# Patient Record
Sex: MALE | Race: BLACK OR AFRICAN AMERICAN | NOT HISPANIC OR LATINO | Employment: FULL TIME | URBAN - METROPOLITAN AREA
[De-identification: names, ages, dates, MRNs, and addresses within clinical notes are randomized per-mention and may not be internally consistent; named-entity substitution may affect disease eponyms.]

---

## 2023-06-10 DIAGNOSIS — I10 ESSENTIAL (PRIMARY) HYPERTENSION: ICD-10-CM

## 2023-06-16 RX ORDER — CHLORTHALIDONE 25 MG/1
TABLET ORAL
Qty: 45 TABLET | Refills: 2 | Status: SHIPPED | OUTPATIENT
Start: 2023-06-16 | End: 2024-03-07

## 2023-07-12 ENCOUNTER — HOSPITAL ENCOUNTER (OUTPATIENT)
Dept: DATA CONVERSION | Facility: HOSPITAL | Age: 43
End: 2023-07-12
Attending: PAIN MEDICINE | Admitting: PAIN MEDICINE
Payer: COMMERCIAL

## 2023-07-12 DIAGNOSIS — M54.16 RADICULOPATHY, LUMBAR REGION: ICD-10-CM

## 2023-07-12 DIAGNOSIS — M54.10 RADICULOPATHY, SITE UNSPECIFIED: ICD-10-CM

## 2023-09-29 VITALS — WEIGHT: 272.93 LBS | HEIGHT: 71 IN | BODY MASS INDEX: 38.21 KG/M2

## 2023-10-04 PROBLEM — K21.9 ESOPHAGEAL REFLUX: Status: ACTIVE | Noted: 2023-10-04

## 2023-10-04 PROBLEM — R19.7 DIARRHEA: Status: ACTIVE | Noted: 2023-10-04

## 2023-10-04 PROBLEM — G47.33 OSA (OBSTRUCTIVE SLEEP APNEA): Status: ACTIVE | Noted: 2023-10-04

## 2023-10-04 PROBLEM — M48.061 LUMBAR STENOSIS: Status: ACTIVE | Noted: 2023-10-04

## 2023-10-04 PROBLEM — I10 BENIGN ESSENTIAL HYPERTENSION: Status: ACTIVE | Noted: 2023-10-04

## 2023-10-04 PROBLEM — N52.9 ORGANIC IMPOTENCE: Status: ACTIVE | Noted: 2023-10-04

## 2023-10-04 RX ORDER — GABAPENTIN 600 MG/1
600 TABLET ORAL 3 TIMES DAILY
COMMUNITY
Start: 2023-06-02 | End: 2023-11-20

## 2023-10-04 RX ORDER — OMEPRAZOLE 20 MG/1
20 CAPSULE, DELAYED RELEASE ORAL
COMMUNITY
Start: 2021-10-21 | End: 2023-11-16 | Stop reason: SDUPTHER

## 2023-10-04 RX ORDER — AMLODIPINE AND BENAZEPRIL HYDROCHLORIDE 10; 40 MG/1; MG/1
1 CAPSULE ORAL DAILY
COMMUNITY
Start: 2020-07-14 | End: 2023-11-02

## 2023-10-04 RX ORDER — TADALAFIL 20 MG/1
20 TABLET ORAL DAILY PRN
COMMUNITY
End: 2023-11-06

## 2023-10-04 NOTE — PROGRESS NOTES
Subjective   Patient ID: Christopher Lam is a 42 y.o. male who presents with hematochezia    HPI   The patient reports a history of hematochezia with almost every bowel movement over the past week.  He reports a long history of passage of multiple soft solid and/or liquid stools per day times many years-unchanged over the past week.  He reports no fever, chills, abdominal pain, nausea, vomiting, melena,,, pain with defecation, anorexia, presyncope.  The patient does report that he did have a colonoscopy 10-14 years ago which was unremarkable  Review of Systems    Objective   There were no vitals taken for this visit.    Physical Exam  Abdomen-soft, obese.  Slightly hypoactive bowel sounds.  There is a 10 x 5 cm mass in the epigastrium and a 4 x 1 cm mass in the umbilical region.  Palpation of both regions revealed no tenderness, increase in warmth, no tenderness or masses noted throughout the rest of the abdomen  Rectal-no stool in vault.  Hemorrhoids noted.  Prostate gland not enlarged, no masses  Assessment/Plan        Assessment  Hematochezia-May be secondary to hemorrhoidal bleeding in the setting of the patient's chronic diarrhea, ischemic colitis-unlikely  Chronic diarrhea-unsure of etiology.  May be secondary to IBS-D.  Plan  Obtain CBC differential, CMP, TSH, coagulation panel, CRP, stool studies, celiac panel ASAP.  If above studies unremarkable, I will consider obtaining a hydrogen breath test and I will start the patient on a probiotic daily.

## 2023-10-05 ENCOUNTER — LAB (OUTPATIENT)
Dept: LAB | Facility: LAB | Age: 43
End: 2023-10-05
Payer: COMMERCIAL

## 2023-10-05 ENCOUNTER — OFFICE VISIT (OUTPATIENT)
Dept: PRIMARY CARE | Facility: CLINIC | Age: 43
End: 2023-10-05
Payer: COMMERCIAL

## 2023-10-05 VITALS
DIASTOLIC BLOOD PRESSURE: 94 MMHG | BODY MASS INDEX: 38.4 KG/M2 | HEART RATE: 90 BPM | WEIGHT: 274.6 LBS | SYSTOLIC BLOOD PRESSURE: 120 MMHG

## 2023-10-05 DIAGNOSIS — R68.82 DECREASED LIBIDO: ICD-10-CM

## 2023-10-05 DIAGNOSIS — K92.1 HEMATOCHEZIA: ICD-10-CM

## 2023-10-05 DIAGNOSIS — K92.1 HEMATOCHEZIA: Primary | ICD-10-CM

## 2023-10-05 DIAGNOSIS — R19.7 DIARRHEA, UNSPECIFIED TYPE: ICD-10-CM

## 2023-10-05 LAB
ALBUMIN SERPL BCP-MCNC: 4.4 G/DL (ref 3.4–5)
ALP SERPL-CCNC: 76 U/L (ref 33–120)
ALT SERPL W P-5'-P-CCNC: 41 U/L (ref 10–52)
ANION GAP SERPL CALC-SCNC: 11 MMOL/L (ref 10–20)
AST SERPL W P-5'-P-CCNC: 25 U/L (ref 9–39)
BASOPHILS # BLD AUTO: 0.03 X10*3/UL (ref 0–0.1)
BASOPHILS NFR BLD AUTO: 0.5 %
BILIRUB SERPL-MCNC: 0.4 MG/DL (ref 0–1.2)
BUN SERPL-MCNC: 15 MG/DL (ref 6–23)
CALCIUM SERPL-MCNC: 9.9 MG/DL (ref 8.6–10.6)
CHLORIDE SERPL-SCNC: 101 MMOL/L (ref 98–107)
CO2 SERPL-SCNC: 34 MMOL/L (ref 21–32)
CREAT SERPL-MCNC: 1.29 MG/DL (ref 0.5–1.3)
CRP SERPL-MCNC: 0.89 MG/DL
EOSINOPHIL # BLD AUTO: 0.04 X10*3/UL (ref 0–0.7)
EOSINOPHIL NFR BLD AUTO: 0.7 %
ERYTHROCYTE [DISTWIDTH] IN BLOOD BY AUTOMATED COUNT: 14.2 % (ref 11.5–14.5)
GFR SERPL CREATININE-BSD FRML MDRD: 70 ML/MIN/1.73M*2
GLUCOSE SERPL-MCNC: 102 MG/DL (ref 74–99)
HCT VFR BLD AUTO: 44.9 % (ref 41–52)
HGB BLD-MCNC: 14.1 G/DL (ref 13.5–17.5)
IMM GRANULOCYTES # BLD AUTO: 0.01 X10*3/UL (ref 0–0.7)
IMM GRANULOCYTES NFR BLD AUTO: 0.2 % (ref 0–0.9)
LYMPHOCYTES # BLD AUTO: 2.07 X10*3/UL (ref 1.2–4.8)
LYMPHOCYTES NFR BLD AUTO: 34.7 %
MCH RBC QN AUTO: 28.7 PG (ref 26–34)
MCHC RBC AUTO-ENTMCNC: 31.4 G/DL (ref 32–36)
MCV RBC AUTO: 91 FL (ref 80–100)
MONOCYTES # BLD AUTO: 0.63 X10*3/UL (ref 0.1–1)
MONOCYTES NFR BLD AUTO: 10.6 %
NEUTROPHILS # BLD AUTO: 3.19 X10*3/UL (ref 1.2–7.7)
NEUTROPHILS NFR BLD AUTO: 53.3 %
NRBC BLD-RTO: 0 /100 WBCS (ref 0–0)
PLATELET # BLD AUTO: 425 X10*3/UL (ref 150–450)
PMV BLD AUTO: 9.7 FL (ref 7.5–11.5)
POTASSIUM SERPL-SCNC: 4.3 MMOL/L (ref 3.5–5.3)
PROT SERPL-MCNC: 7.2 G/DL (ref 6.4–8.2)
RBC # BLD AUTO: 4.92 X10*6/UL (ref 4.5–5.9)
SODIUM SERPL-SCNC: 142 MMOL/L (ref 136–145)
TSH SERPL-ACNC: 2.67 MIU/L (ref 0.44–3.98)
WBC # BLD AUTO: 6 X10*3/UL (ref 4.4–11.3)

## 2023-10-05 PROCEDURE — 86258 DGP ANTIBODY EACH IG CLASS: CPT

## 2023-10-05 PROCEDURE — 3080F DIAST BP >= 90 MM HG: CPT | Performed by: INTERNAL MEDICINE

## 2023-10-05 PROCEDURE — 80050 GENERAL HEALTH PANEL: CPT

## 2023-10-05 PROCEDURE — 86364 TISS TRNSGLTMNASE EA IG CLAS: CPT

## 2023-10-05 PROCEDURE — 99213 OFFICE O/P EST LOW 20 MIN: CPT | Performed by: INTERNAL MEDICINE

## 2023-10-05 PROCEDURE — 3074F SYST BP LT 130 MM HG: CPT | Performed by: INTERNAL MEDICINE

## 2023-10-05 PROCEDURE — 84402 ASSAY OF FREE TESTOSTERONE: CPT

## 2023-10-05 PROCEDURE — 85730 THROMBOPLASTIN TIME PARTIAL: CPT

## 2023-10-05 PROCEDURE — 36415 COLL VENOUS BLD VENIPUNCTURE: CPT

## 2023-10-05 PROCEDURE — 85610 PROTHROMBIN TIME: CPT

## 2023-10-05 PROCEDURE — 86140 C-REACTIVE PROTEIN: CPT

## 2023-10-05 RX ORDER — MULTIVITAMIN
1 TABLET ORAL DAILY
COMMUNITY

## 2023-10-06 LAB
APTT PPP: 33 SECONDS (ref 27–38)
GLIADIN PEPTIDE IGA SER IA-ACNC: 1.9 U/ML
GLIADIN PEPTIDE IGG SER IA-ACNC: 39.9 U/ML
INR PPP: 1 (ref 0.9–1.1)
PROTHROMBIN TIME: 11.8 SECONDS (ref 9.8–12.8)
TTG IGA SER IA-ACNC: <1 U/ML
TTG IGG SER IA-ACNC: <1 U/ML

## 2023-10-09 ENCOUNTER — LAB (OUTPATIENT)
Dept: LAB | Facility: LAB | Age: 43
End: 2023-10-09
Payer: COMMERCIAL

## 2023-10-09 DIAGNOSIS — K92.1 MELENA: ICD-10-CM

## 2023-10-09 DIAGNOSIS — R19.7 DIARRHEA, UNSPECIFIED: Primary | ICD-10-CM

## 2023-10-09 DIAGNOSIS — K92.1 HEMATOCHEZIA: ICD-10-CM

## 2023-10-09 DIAGNOSIS — R19.7 DIARRHEA, UNSPECIFIED: ICD-10-CM

## 2023-10-09 LAB
TESTOSTERONE FREE (CHAN): 76 PG/ML (ref 35–155)
TESTOSTERONE,TOTAL,LC-MS/MS: 335 NG/DL (ref 250–1100)

## 2023-10-09 PROCEDURE — 83993 ASSAY FOR CALPROTECTIN FECAL: CPT

## 2023-10-09 PROCEDURE — 87329 GIARDIA AG IA: CPT

## 2023-10-09 PROCEDURE — 87506 IADNA-DNA/RNA PROBE TQ 6-11: CPT

## 2023-10-10 LAB

## 2023-10-12 LAB
CALPROTECTIN STL-MCNT: 228 UG/G
G LAMBLIA AG STL QL IA: NEGATIVE

## 2023-11-01 DIAGNOSIS — I10 BENIGN ESSENTIAL HYPERTENSION: ICD-10-CM

## 2023-11-02 RX ORDER — AMLODIPINE AND BENAZEPRIL HYDROCHLORIDE 10; 40 MG/1; MG/1
1 CAPSULE ORAL DAILY
Qty: 90 CAPSULE | Refills: 3 | Status: SHIPPED | OUTPATIENT
Start: 2023-11-02

## 2023-11-04 PROBLEM — M77.12 LATERAL EPICONDYLITIS OF LEFT ELBOW: Status: ACTIVE | Noted: 2023-11-04

## 2023-11-04 PROBLEM — R19.7 DIARRHEA: Status: RESOLVED | Noted: 2023-10-04 | Resolved: 2023-11-04

## 2023-11-04 PROBLEM — D49.2 LUMBAR SPINE TUMOR: Status: ACTIVE | Noted: 2023-11-04

## 2023-11-04 RX ORDER — NABUMETONE 500 MG/1
2 TABLET, FILM COATED ORAL 2 TIMES DAILY
COMMUNITY
Start: 2022-09-07 | End: 2023-11-06

## 2023-11-04 RX ORDER — GABAPENTIN 300 MG/1
300 CAPSULE ORAL
COMMUNITY
Start: 2023-05-18 | End: 2023-11-06

## 2023-11-04 RX ORDER — TADALAFIL 10 MG/1
10 TABLET ORAL DAILY PRN
COMMUNITY
Start: 2020-09-15 | End: 2023-11-06

## 2023-11-06 ENCOUNTER — OFFICE VISIT (OUTPATIENT)
Dept: GASTROENTEROLOGY | Facility: CLINIC | Age: 43
End: 2023-11-06
Payer: COMMERCIAL

## 2023-11-06 VITALS
WEIGHT: 276.35 LBS | HEART RATE: 82 BPM | RESPIRATION RATE: 18 BRPM | SYSTOLIC BLOOD PRESSURE: 124 MMHG | OXYGEN SATURATION: 97 % | BODY MASS INDEX: 38.64 KG/M2 | DIASTOLIC BLOOD PRESSURE: 80 MMHG | TEMPERATURE: 97.7 F

## 2023-11-06 DIAGNOSIS — K52.9 CHRONIC DIARRHEA: ICD-10-CM

## 2023-11-06 DIAGNOSIS — R14.0 ABDOMINAL BLOATING: ICD-10-CM

## 2023-11-06 DIAGNOSIS — R76.8 POSITIVE AUTOANTIBODY SCREENING FOR CELIAC DISEASE: Primary | ICD-10-CM

## 2023-11-06 DIAGNOSIS — K92.1 HEMATOCHEZIA: ICD-10-CM

## 2023-11-06 PROCEDURE — 99204 OFFICE O/P NEW MOD 45 MIN: CPT | Performed by: NURSE PRACTITIONER

## 2023-11-06 PROCEDURE — 3074F SYST BP LT 130 MM HG: CPT | Performed by: NURSE PRACTITIONER

## 2023-11-06 PROCEDURE — 3079F DIAST BP 80-89 MM HG: CPT | Performed by: NURSE PRACTITIONER

## 2023-11-06 RX ORDER — POLYETHYLENE GLYCOL 3350 17 G/17G
238 POWDER, FOR SOLUTION ORAL ONCE
Qty: 238 G | Refills: 0 | Status: SHIPPED | OUTPATIENT
Start: 2023-11-06 | End: 2023-11-06

## 2023-11-06 ASSESSMENT — ENCOUNTER SYMPTOMS
FEVER: 0
PALPITATIONS: 0
MYALGIAS: 0
BACK PAIN: 0
HEMATURIA: 0
WEAKNESS: 0
ARTHRALGIAS: 0
FLANK PAIN: 0
ADENOPATHY: 0
DIZZINESS: 0
HALLUCINATIONS: 0
SORE THROAT: 0
FATIGUE: 0
DYSURIA: 0
LIGHT-HEADEDNESS: 0
AGITATION: 0
NERVOUS/ANXIOUS: 0
PHOTOPHOBIA: 0
NUMBNESS: 0
JOINT SWELLING: 0
DIAPHORESIS: 0
SHORTNESS OF BREATH: 0
WHEEZING: 0
CHILLS: 0
EYE PAIN: 0
COUGH: 0

## 2023-11-06 NOTE — PROGRESS NOTES
"Subjective   Patient ID: Christopher Lam is a 42 y.o. male who presents for evaluation of rectal bleeding and chronic diarrhea     This is a 42 year old AAM with history of obesity, FLORINA on CPAP, HTN, and suspected Schwannoma who is presenting to the GI clinic for an initial visit.     History per pt and review of EMR including OSH records     Reports in 9/2023 he developed a week's worth of bright to dark red rectal bleeding. He saw blood on the toilet paper and mixed into his stools at that time (this was confirmed with photos on his phone he showed me). States he saw his PCP where  he was noted to have a hemorrhoid.     Reports for at least 10 years he's been having diarrhea. States \"90 %\" of his stools are loose. He has diarrhea with urgency 5-6 times per day (frequently after eating). . He has diarrhea which wakes him from sleep at night on occasion. He occasionally has formed stools.     Recent work up per PCP noting fecal calprotectin elevated to  228. Celiac serologies noting a normal tTG IgA, but DGP IgG was elevated to 39.9. Stool pathogen panel was negative. Tested negative for giardia.     ROS positive for several years' worth of  occasional  postprandial abdominal bloating.     Denies unintentional weight loss, abdominal pain, abdominal discomfort, vomiting, constipation, hematemesis, or melena.     Reports some weight gain recently. 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 Describes his job as sedentary.     Diet includes gluten.         Past medical history:   See above    Past surgical history:  None    Family history:   Paternal great aunt- pancreatic cancer   No IBD or celiac disease   Father- bone cancer (now in remission)     Social history:   Rarely drinks alcohol; no binge drinking  Denies use of tobacco or illicit drugs       Colonoscopy 12/5/14 with random biopsies was unremarkable (Dr. Shaniqua Sexton); tested negative for Cdiff at that time       Review of Systems "   Constitutional:  Negative for chills, diaphoresis, fatigue and fever.   HENT:  Negative for congestion, ear pain, hearing loss, sneezing and sore throat.    Eyes:  Negative for photophobia, pain and visual disturbance.   Respiratory:  Negative for cough, shortness of breath and wheezing.    Cardiovascular:  Negative for chest pain, palpitations and leg swelling.   Endocrine: Negative for cold intolerance and heat intolerance.   Genitourinary:  Negative for dysuria, flank pain, hematuria and urgency.   Musculoskeletal:  Negative for arthralgias, back pain, gait problem, joint swelling and myalgias.   Skin:  Negative for rash.   Neurological:  Negative for dizziness, syncope, weakness, light-headedness and numbness.   Hematological:  Negative for adenopathy.   Psychiatric/Behavioral:  Negative for agitation and hallucinations. The patient is not nervous/anxious.        /80 (BP Location: Right arm, Patient Position: Sitting)   Pulse 82   Temp 36.5 °C (97.7 °F)   Resp 18   Wt 125 kg (276 lb 5.6 oz)   SpO2 97%   BMI 38.64 kg/m²      Objective     Allergies   Allergen Reactions    Shellfish Derived Diarrhea and Nausea/vomiting     Current Outpatient Medications   Medication Sig Dispense Refill    amLODIPine-benazepriL (Lotrel) 10-40 mg capsule TAKE 1 CAPSULE BY MOUTH EVERY DAY 90 capsule 3    chlorthalidone (Hygroton) 25 mg tablet TAKE 1/2 TABLET BY MOUTH EVERY DAY 45 tablet 2    gabapentin (Neurontin) 600 mg tablet Take 1 tablet (600 mg) by mouth 3 times a day.      multivitamin tablet Take 1 tablet by mouth once daily.      NON FORMULARY Take 1 each by mouth once daily.  Emergen-C Five Oral Packet      omeprazole (PriLOSEC) 20 mg DR capsule Take 1 capsule (20 mg) by mouth once daily in the morning. Take before meals.      polyethylene glycol (Glycolax, Miralax) 17 gram/dose powder Take 238 g by mouth 1 time for 1 dose. Use as directed by  Endoscopy Department for colonoscopy 238 g 0     No current  facility-administered medications for this visit.        Physical Exam  Constitutional:       General: He is not in acute distress.     Appearance: Normal appearance. He is obese.   HENT:      Head: Normocephalic and atraumatic.      Mouth/Throat:      Mouth: Mucous membranes are moist.   Eyes:      Conjunctiva/sclera: Conjunctivae normal.   Cardiovascular:      Rate and Rhythm: Normal rate and regular rhythm.      Heart sounds: No murmur heard.     No gallop.   Pulmonary:      Effort: Pulmonary effort is normal.      Breath sounds: Normal breath sounds.   Abdominal:      General: Bowel sounds are normal. There is no distension.      Tenderness: There is no abdominal tenderness. There is no guarding.   Musculoskeletal:         General: No swelling or deformity. Normal range of motion.      Cervical back: Normal range of motion. No rigidity.   Skin:     General: Skin is warm and dry.      Coloration: Skin is not jaundiced.      Findings: No lesion or rash.   Neurological:      General: No focal deficit present.      Mental Status: He is alert and oriented to person, place, and time.   Psychiatric:         Mood and Affect: Mood normal.         Assessment/Plan   Problem List Items Addressed This Visit       Hematochezia    Relevant Medications    polyethylene glycol (Glycolax, Miralax) 17 gram/dose powder    Other Relevant Orders    Colonoscopy Diagnostic     Other Visit Diagnoses       Positive autoantibody screening for celiac disease    -  Primary    Relevant Orders    EGD    Chronic diarrhea        Relevant Medications    polyethylene glycol (Glycolax, Miralax) 17 gram/dose powder    Other Relevant Orders    EGD    Colonoscopy Diagnostic    Abdominal bloating        Relevant Orders    EGD          Chronic diarrhea, postprandial abdominal bloating: consider IBD with elevated fecal calprotectin although this seems less likely given normal colonoscopy several years ago. Also consider  celiac disease with elevated DGP  IgG.   - will proceed with EGD with duodenal biopsy   - will proceed with colonoscopy with random biopsies  - MiraLAX/Gatorade split bowel prep for colonoscopy     2. Hematochezia: consider hemorrhoids, IBD, polyps, and colorectal cancer  - colonoscopy as above    3. Follow up:  - return to clinic 3 weeks after completion of the above

## 2023-11-06 NOTE — PATIENT INSTRUCTIONS
Thanks for coming to the GI clinic.     I would like you to get an upper endoscopy. Please call [phone] to schedule.     I would like you to get a colonoscopy. Please call [phone] to schedule.   Please read all of the instructions 7 days before your colonoscopy.   You will need to take ONLY clear liquids the ENTIRE day before your procedure. These include (clear fruit juices, soda, Gatorade, broth, jello and coffee/tea) Avoid red and purple drinks. No cream or milk in the coffee.   You will need to take a bowel preparation.   You will also need a .      Follow up 3 weeks after completion of the above.

## 2023-11-16 DIAGNOSIS — M48.062 SPINAL STENOSIS OF LUMBAR REGION WITH NEUROGENIC CLAUDICATION: Primary | ICD-10-CM

## 2023-11-16 DIAGNOSIS — K21.9 GASTROESOPHAGEAL REFLUX DISEASE, UNSPECIFIED WHETHER ESOPHAGITIS PRESENT: ICD-10-CM

## 2023-11-16 RX ORDER — OMEPRAZOLE 20 MG/1
20 CAPSULE, DELAYED RELEASE ORAL
Qty: 90 CAPSULE | Refills: 3 | Status: SHIPPED | OUTPATIENT
Start: 2023-11-16

## 2023-11-20 RX ORDER — GABAPENTIN 600 MG/1
600 TABLET ORAL 3 TIMES DAILY
Qty: 90 TABLET | Refills: 3 | Status: SHIPPED | OUTPATIENT
Start: 2023-11-20

## 2024-02-14 ENCOUNTER — ANESTHESIA EVENT (OUTPATIENT)
Dept: GASTROENTEROLOGY | Facility: HOSPITAL | Age: 44
End: 2024-02-14
Payer: COMMERCIAL

## 2024-02-14 PROBLEM — K52.9 CHRONIC DIARRHEA: Status: ACTIVE | Noted: 2024-02-14

## 2024-02-15 ENCOUNTER — ANESTHESIA (OUTPATIENT)
Dept: GASTROENTEROLOGY | Facility: HOSPITAL | Age: 44
End: 2024-02-15
Payer: COMMERCIAL

## 2024-02-15 ENCOUNTER — HOSPITAL ENCOUNTER (OUTPATIENT)
Dept: GASTROENTEROLOGY | Facility: HOSPITAL | Age: 44
Setting detail: OUTPATIENT SURGERY
Discharge: HOME | End: 2024-02-15
Payer: COMMERCIAL

## 2024-02-15 VITALS
TEMPERATURE: 97.3 F | BODY MASS INDEX: 38.08 KG/M2 | DIASTOLIC BLOOD PRESSURE: 53 MMHG | OXYGEN SATURATION: 96 % | RESPIRATION RATE: 19 BRPM | SYSTOLIC BLOOD PRESSURE: 109 MMHG | WEIGHT: 272 LBS | HEART RATE: 91 BPM | HEIGHT: 71 IN

## 2024-02-15 DIAGNOSIS — R14.0 ABDOMINAL BLOATING: ICD-10-CM

## 2024-02-15 DIAGNOSIS — K92.1 HEMATOCHEZIA: ICD-10-CM

## 2024-02-15 DIAGNOSIS — R76.8 POSITIVE AUTOANTIBODY SCREENING FOR CELIAC DISEASE: Primary | ICD-10-CM

## 2024-02-15 DIAGNOSIS — K52.9 CHRONIC DIARRHEA: ICD-10-CM

## 2024-02-15 PROCEDURE — 2500000004 HC RX 250 GENERAL PHARMACY W/ HCPCS (ALT 636 FOR OP/ED)

## 2024-02-15 PROCEDURE — A45380 PR COLONOSCOPY,BIOPSY

## 2024-02-15 PROCEDURE — 3700000002 HC GENERAL ANESTHESIA TIME - EACH INCREMENTAL 1 MINUTE

## 2024-02-15 PROCEDURE — 7100000009 HC PHASE TWO TIME - INITIAL BASE CHARGE

## 2024-02-15 PROCEDURE — A45380 PR COLONOSCOPY,BIOPSY: Performed by: ANESTHESIOLOGY

## 2024-02-15 PROCEDURE — 45380 COLONOSCOPY AND BIOPSY: CPT | Performed by: INTERNAL MEDICINE

## 2024-02-15 PROCEDURE — 7100000010 HC PHASE TWO TIME - EACH INCREMENTAL 1 MINUTE

## 2024-02-15 PROCEDURE — 0753T DGTZ GLS MCRSCP SLD LEVEL IV: CPT | Mod: TC,SUR | Performed by: INTERNAL MEDICINE

## 2024-02-15 PROCEDURE — 43239 EGD BIOPSY SINGLE/MULTIPLE: CPT | Performed by: INTERNAL MEDICINE

## 2024-02-15 PROCEDURE — 88305 TISSUE EXAM BY PATHOLOGIST: CPT | Performed by: PATHOLOGY

## 2024-02-15 PROCEDURE — 2500000005 HC RX 250 GENERAL PHARMACY W/O HCPCS

## 2024-02-15 PROCEDURE — 3700000001 HC GENERAL ANESTHESIA TIME - INITIAL BASE CHARGE

## 2024-02-15 RX ORDER — LIDOCAINE HYDROCHLORIDE 10 MG/ML
0.1 INJECTION INFILTRATION; PERINEURAL ONCE
OUTPATIENT
Start: 2024-02-15 | End: 2024-02-15

## 2024-02-15 RX ORDER — SODIUM CHLORIDE, SODIUM LACTATE, POTASSIUM CHLORIDE, CALCIUM CHLORIDE 600; 310; 30; 20 MG/100ML; MG/100ML; MG/100ML; MG/100ML
INJECTION, SOLUTION INTRAVENOUS CONTINUOUS PRN
Status: DISCONTINUED | OUTPATIENT
Start: 2024-02-15 | End: 2024-02-15

## 2024-02-15 RX ORDER — PROPOFOL 10 MG/ML
INJECTION, EMULSION INTRAVENOUS AS NEEDED
Status: DISCONTINUED | OUTPATIENT
Start: 2024-02-15 | End: 2024-02-15

## 2024-02-15 RX ORDER — MIDAZOLAM HYDROCHLORIDE 1 MG/ML
INJECTION INTRAMUSCULAR; INTRAVENOUS AS NEEDED
Status: DISCONTINUED | OUTPATIENT
Start: 2024-02-15 | End: 2024-02-15

## 2024-02-15 RX ORDER — SODIUM CHLORIDE, SODIUM LACTATE, POTASSIUM CHLORIDE, CALCIUM CHLORIDE 600; 310; 30; 20 MG/100ML; MG/100ML; MG/100ML; MG/100ML
100 INJECTION, SOLUTION INTRAVENOUS CONTINUOUS
OUTPATIENT
Start: 2024-02-15

## 2024-02-15 RX ORDER — ONDANSETRON HYDROCHLORIDE 2 MG/ML
4 INJECTION, SOLUTION INTRAVENOUS ONCE AS NEEDED
OUTPATIENT
Start: 2024-02-15

## 2024-02-15 RX ORDER — ACETAMINOPHEN 325 MG/1
650 TABLET ORAL EVERY 4 HOURS PRN
OUTPATIENT
Start: 2024-02-15

## 2024-02-15 RX ORDER — LIDOCAINE HCL/PF 100 MG/5ML
SYRINGE (ML) INTRAVENOUS AS NEEDED
Status: DISCONTINUED | OUTPATIENT
Start: 2024-02-15 | End: 2024-02-15

## 2024-02-15 RX ORDER — PROPOFOL 10 MG/ML
INJECTION, EMULSION INTRAVENOUS CONTINUOUS PRN
Status: DISCONTINUED | OUTPATIENT
Start: 2024-02-15 | End: 2024-02-15

## 2024-02-15 RX ADMIN — PROPOFOL 20 MG: 10 INJECTION, EMULSION INTRAVENOUS at 07:46

## 2024-02-15 RX ADMIN — PROPOFOL 20 MG: 10 INJECTION, EMULSION INTRAVENOUS at 07:45

## 2024-02-15 RX ADMIN — MIDAZOLAM HYDROCHLORIDE 2 MG: 1 INJECTION, SOLUTION INTRAMUSCULAR; INTRAVENOUS at 07:38

## 2024-02-15 RX ADMIN — PROPOFOL 150 MCG/KG/MIN: 10 INJECTION, EMULSION INTRAVENOUS at 07:44

## 2024-02-15 RX ADMIN — LIDOCAINE HYDROCHLORIDE 100 MG: 20 INJECTION INTRAVENOUS at 07:44

## 2024-02-15 RX ADMIN — PROPOFOL 30 MG: 10 INJECTION, EMULSION INTRAVENOUS at 07:44

## 2024-02-15 RX ADMIN — SODIUM CHLORIDE, POTASSIUM CHLORIDE, SODIUM LACTATE AND CALCIUM CHLORIDE: 600; 310; 30; 20 INJECTION, SOLUTION INTRAVENOUS at 07:40

## 2024-02-15 ASSESSMENT — COLUMBIA-SUICIDE SEVERITY RATING SCALE - C-SSRS
6. HAVE YOU EVER DONE ANYTHING, STARTED TO DO ANYTHING, OR PREPARED TO DO ANYTHING TO END YOUR LIFE?: NO
1. IN THE PAST MONTH, HAVE YOU WISHED YOU WERE DEAD OR WISHED YOU COULD GO TO SLEEP AND NOT WAKE UP?: NO
2. HAVE YOU ACTUALLY HAD ANY THOUGHTS OF KILLING YOURSELF?: NO

## 2024-02-15 ASSESSMENT — PAIN SCALES - GENERAL
PAINLEVEL_OUTOF10: 0 - NO PAIN
PAIN_LEVEL: 0

## 2024-02-15 ASSESSMENT — PAIN - FUNCTIONAL ASSESSMENT
PAIN_FUNCTIONAL_ASSESSMENT: 0-10

## 2024-02-15 NOTE — DISCHARGE INSTRUCTIONS

## 2024-02-15 NOTE — ANESTHESIA PREPROCEDURE EVALUATION
Patient: Christopher Lam    Procedure Information       Date/Time: 02/15/24 0730    Scheduled providers: Og Hinson MD    Procedures:       EGD      COLONOSCOPY    Location: [facility]            Relevant Problems   Anesthesia (within normal limits)  No family hx  Only had MAC      Cardiovascular   (+) Benign essential hypertension      Endocrine (within normal limits)      GI   (+) Chronic diarrhea   (+) Esophageal reflux   (+) Gastrointestinal hemorrhage with melena      /Renal (within normal limits)      Neuro/Psych (within normal limits)      Pulmonary   (+) FLORINA (obstructive sleep apnea) (CPAP)      GI/Hepatic (within normal limits)      Hematology (within normal limits)      Musculoskeletal   (+) Lumbar stenosis      Eyes, Ears, Nose, and Throat (within normal limits)      Infectious Disease (within normal limits)      Other   (+) Lateral epicondylitis of left elbow       Clinical information reviewed:                   NPO Detail:  No data recorded     Physical Exam    Airway  Mallampati: III  TM distance: >3 FB  Neck ROM: full     Cardiovascular - normal exam     Dental   Comments: intact   Pulmonary - normal exam     Abdominal          Vitals:    02/15/24 0715   BP: (!) 140/97   Pulse: 99   Resp: 18   Temp: 36.4 °C (97.5 °F)   SpO2: 95%       Past Surgical History:   Procedure Laterality Date    OTHER SURGICAL HISTORY  10/04/2022    No history of surgery     Past Medical History:   Diagnosis Date    Personal history of other diseases of the circulatory system     History of hypertension    Sleep apnea        Current Outpatient Medications:     amLODIPine-benazepriL (Lotrel) 10-40 mg capsule, TAKE 1 CAPSULE BY MOUTH EVERY DAY, Disp: 90 capsule, Rfl: 3    chlorthalidone (Hygroton) 25 mg tablet, TAKE 1/2 TABLET BY MOUTH EVERY DAY, Disp: 45 tablet, Rfl: 2    gabapentin (Neurontin) 600 mg tablet, TAKE 1 TABLET BY MOUTH THREE TIMES A DAY, Disp: 90 tablet, Rfl: 3    multivitamin tablet,  Take 1 tablet by mouth once daily., Disp: , Rfl:     NON FORMULARY, Take 1 each by mouth once daily.  Emergen-C Five Oral Packet, Disp: , Rfl:     omeprazole (PriLOSEC) 20 mg DR capsule, Take 1 capsule (20 mg) by mouth once daily in the morning. Take before meals., Disp: 90 capsule, Rfl: 3  Prior to Admission medications    Medication Sig Start Date End Date Taking? Authorizing Provider   amLODIPine-benazepriL (Lotrel) 10-40 mg capsule TAKE 1 CAPSULE BY MOUTH EVERY DAY 11/2/23  Yes John Blair MD   chlorthalidone (Hygroton) 25 mg tablet TAKE 1/2 TABLET BY MOUTH EVERY DAY 6/16/23  Yes John Blair MD   gabapentin (Neurontin) 600 mg tablet TAKE 1 TABLET BY MOUTH THREE TIMES A DAY 11/20/23  Yes Thomas Haines MD   multivitamin tablet Take 1 tablet by mouth once daily.   Yes Historical Provider, MD   NON FORMULARY Take 1 each by mouth once daily.  Emergen-C Five Oral Packet 7/14/20  Yes Historical Provider, MD   omeprazole (PriLOSEC) 20 mg DR capsule Take 1 capsule (20 mg) by mouth once daily in the morning. Take before meals. 11/16/23  Yes John Blair MD     Allergies   Allergen Reactions    Shellfish Derived Diarrhea and Nausea/vomiting     Social History     Tobacco Use    Smoking status: Never    Smokeless tobacco: Never   Substance Use Topics    Alcohol use: Never         Chemistry    Lab Results   Component Value Date/Time     10/05/2023 1242    K 4.3 10/05/2023 1242     10/05/2023 1242    CO2 34 (H) 10/05/2023 1242    BUN 15 10/05/2023 1242    CREATININE 1.29 10/05/2023 1242    Lab Results   Component Value Date/Time    CALCIUM 9.9 10/05/2023 1242    ALKPHOS 76 10/05/2023 1242    AST 25 10/05/2023 1242    ALT 41 10/05/2023 1242    BILITOT 0.4 10/05/2023 1242          Lab Results   Component Value Date/Time    WBC 6.0 10/05/2023 1242    HGB 14.1 10/05/2023 1242    HCT 44.9 10/05/2023 1242     10/05/2023 1242     Lab Results   Component Value Date/Time    PROTIME 11.8 10/05/2023 1247     INR 1.0 10/05/2023 1242     No results found for this or any previous visit (from the past 4464 hour(s)).  No results found for this or any previous visit from the past 1095 days.     Anesthesia Plan    History of general anesthesia?: no  History of complications of general anesthesia?: unknown/emergency    ASA 3     MAC     intravenous induction   Anesthetic plan and risks discussed with patient.  Use of blood products discussed with patient who consented to blood products.    Plan discussed with CAA.

## 2024-02-15 NOTE — H&P
"History Of Present Illness  Christopher Lam is a 43 y.o. male presenting with chronic diarrhea and rectal bleeding with history of colonoscopy 12/5/14 (Dr. Shaniqua Sexton) with normal random biopsies here for EGD and repeat colonoscopy with biopsies.     Past Medical History  Past Medical History:   Diagnosis Date    Personal history of other diseases of the circulatory system     History of hypertension    Sleep apnea      Surgical History  Past Surgical History:   Procedure Laterality Date    OTHER SURGICAL HISTORY  10/04/2022    No history of surgery     Social History  He reports that he has never smoked. He has never used smokeless tobacco. He reports that he does not drink alcohol and does not use drugs.    Family History  Family History   Problem Relation Name Age of Onset    Arthritis Mother      Other (cardiac disorder) Mother      Cancer Mother      Arthritis Father      Other (cardiac disorder) Father      Cancer Father          Allergies  Allergies   Allergen Reactions    Shellfish Derived Diarrhea and Nausea/vomiting          Physical Exam  Constitutional:       Appearance: Normal appearance.   HENT:      Mouth/Throat:      Mouth: Mucous membranes are moist.   Eyes:      Conjunctiva/sclera: Conjunctivae normal.   Cardiovascular:      Rate and Rhythm: Normal rate.   Pulmonary:      Effort: Pulmonary effort is normal.   Abdominal:      Palpations: Abdomen is soft.   Skin:     General: Skin is warm.   Neurological:      Mental Status: He is oriented to person, place, and time.   Psychiatric:         Mood and Affect: Mood normal.          Last Recorded Vitals  Blood pressure (!) 140/97, pulse 99, temperature 36.4 °C (97.5 °F), resp. rate 18, height 1.803 m (5' 11\"), weight 123 kg (272 lb), SpO2 95 %.    Assessment/Plan   chronic diarrhea and rectal bleeding with history of colonoscopy 12/5/14 (Dr. Shaniqua Sexton) with normal random biopsies here for EGD and repeat colonoscopy with biopsies.     Og Hinson, " MD

## 2024-02-15 NOTE — ANESTHESIA POSTPROCEDURE EVALUATION
Patient: Christopher Lam    Procedure Summary       Date: 02/15/24 Room / Location: [facility]    Anesthesia Start: 0741 Anesthesia Stop: 0830    Procedures:       EGD      COLONOSCOPY Diagnosis:       Positive autoantibody screening for celiac disease      Chronic diarrhea      Abdominal bloating      Hematochezia      Positive autoantibody screening for celiac disease    Scheduled Providers: Og Hinson MD; Lexus Post RN; Vickie Holley MD Responsible Provider: Vickie Holley MD    Anesthesia Type: MAC ASA Status: 3            Anesthesia Type: MAC    Vitals Value Taken Time   /62 02/15/24 0827   Temp 36.3 °C (97.3 °F) 02/15/24 0827   Pulse 102 02/15/24 0827   Resp 12 02/15/24 0827   SpO2 96 % 02/15/24 0827       Anesthesia Post Evaluation    Patient location during evaluation: PACU  Patient participation: complete - patient participated  Level of consciousness: awake  Pain score: 0  Pain management: adequate  Airway patency: patent  There was medical reason for not screening for obstructive sleep apnea and/or not using of two or more mitigation strategies.Cardiovascular status: acceptable, hemodynamically stable and blood pressure returned to baseline  Respiratory status: acceptable and room air  Hydration status: acceptable  Postoperative Nausea and Vomiting: none    There were no known notable events for this encounter.

## 2024-02-22 LAB
LABORATORY COMMENT REPORT: NORMAL
PATH REPORT.FINAL DX SPEC: NORMAL
PATH REPORT.GROSS SPEC: NORMAL
PATH REPORT.TOTAL CANCER: NORMAL

## 2024-02-22 NOTE — RESULT ENCOUNTER NOTE
A  [employer] message was sent to patient regarding the results and recommendations as follows:    Dear Mr. Lam,    I am writing you to inform you that the biopsies from the duodenum (beginning part of your small bowel) showed no signs of celiac disease. The polyps in your stomach were completely benign polyps. The polyps did not have pre-cancerous changes or cancer in them. There was mild inflammation seen in the terminal ileum (end part of your small bowel) which may be due to medications such as NSAIDs (Motrin, Advil, Aleve, ibuprofen, naproxen, etc.). The colon and rectal biopsies were all normal with no signs of colitis.    Copies of all the reports were sent to your gastroenterology nurse practitioner, Rudolph Gunderson CNP and your primary care physician, John Blair MD.  Please follow up with them for further evaluation and management.    If you have any questions regarding your endoscopy, colonoscopy and/or pathology results, please do not hesitate to contact me at [phone] or by replying to this message.  Thank you for allowing us to participate in your medical care.    Sincerely,    Og Hinson MD, CRICKET  Chief Medical   [facility]  Associate Chief and Director of Clinical Operations  Division of Gastroenterology and Liver Disease   Master Clinician  [facility]  Professor of Medicine  [facility]

## 2024-03-07 ENCOUNTER — OFFICE VISIT (OUTPATIENT)
Dept: PRIMARY CARE | Facility: CLINIC | Age: 44
End: 2024-03-07
Payer: COMMERCIAL

## 2024-03-07 VITALS
HEART RATE: 74 BPM | DIASTOLIC BLOOD PRESSURE: 84 MMHG | BODY MASS INDEX: 37.24 KG/M2 | SYSTOLIC BLOOD PRESSURE: 100 MMHG | WEIGHT: 267 LBS

## 2024-03-07 DIAGNOSIS — K52.9 CHRONIC DIARRHEA: Primary | ICD-10-CM

## 2024-03-07 DIAGNOSIS — I10 ESSENTIAL (PRIMARY) HYPERTENSION: ICD-10-CM

## 2024-03-07 PROCEDURE — 3074F SYST BP LT 130 MM HG: CPT | Performed by: INTERNAL MEDICINE

## 2024-03-07 PROCEDURE — 3079F DIAST BP 80-89 MM HG: CPT | Performed by: INTERNAL MEDICINE

## 2024-03-07 PROCEDURE — 1036F TOBACCO NON-USER: CPT | Performed by: INTERNAL MEDICINE

## 2024-03-07 PROCEDURE — 99212 OFFICE O/P EST SF 10 MIN: CPT | Performed by: INTERNAL MEDICINE

## 2024-03-07 RX ORDER — CHLORTHALIDONE 25 MG/1
12.5 TABLET ORAL DAILY
Qty: 45 TABLET | Refills: 2 | Status: SHIPPED | OUTPATIENT
Start: 2024-03-07

## 2024-03-07 NOTE — PROGRESS NOTES
Subjective   Patient ID: Christopher Lam is a 43 y.o. male who presents for follow-up visit    HPI   The patient reports no episodes of hematochezia since soon after his last office visit.  He still reports continued chronic passage of multiple soft solid and/or liquid stools per day times years-unchanged since his last office visit.  He reports no fever, chills, abdominal pain, nausea, vomiting, melena, anorexia, presyncope.    Results of the patient's colonoscopy were reviewed.  That the patient has not used any nonsteroidal anti-inflammatory agents for approximately 1 year  Review of Systems    Objective   There were no vitals taken for this visit.    Physical Exam  Cardiac-rate normal, rhythm regular, positive S4 noted, no murmurs, no JVD.  Abdomen-soft, obese hypoactive bowel sounds.  There is a 10 x 6 cm mass in the epigastrium and a 3 x 1 cm mass in the umbilical region.  Palpation of both regions revealed no tenderness, increase in warmth; no tenderness or masses noted throughout the rest of the abdomen.  Assessment/Plan        Assessment  Hypertension-diastolic blood pressure borderline  Acute ileitis-unsure of etiology  Chronic diarrhea-unsure of etiology.  May be secondary to IBS-D.  Plan  Refer to GI for further evaluation and treatment.  I told the patient that once there is a treatment plan for his symptoms, I would consider starting him on a GLP-1 agonist such as Wegovy, Ozempic, Mounjaro.

## 2024-03-29 ENCOUNTER — OFFICE VISIT (OUTPATIENT)
Dept: GASTROENTEROLOGY | Facility: CLINIC | Age: 44
End: 2024-03-29
Payer: COMMERCIAL

## 2024-03-29 VITALS
SYSTOLIC BLOOD PRESSURE: 156 MMHG | DIASTOLIC BLOOD PRESSURE: 104 MMHG | HEART RATE: 108 BPM | RESPIRATION RATE: 18 BRPM | BODY MASS INDEX: 37.96 KG/M2 | TEMPERATURE: 97.9 F | OXYGEN SATURATION: 96 % | WEIGHT: 272.16 LBS

## 2024-03-29 DIAGNOSIS — K52.9 ILEITIS: ICD-10-CM

## 2024-03-29 DIAGNOSIS — K52.9 CHRONIC DIARRHEA: Primary | ICD-10-CM

## 2024-03-29 PROCEDURE — 99214 OFFICE O/P EST MOD 30 MIN: CPT | Performed by: NURSE PRACTITIONER

## 2024-03-29 PROCEDURE — 3077F SYST BP >= 140 MM HG: CPT | Performed by: NURSE PRACTITIONER

## 2024-03-29 PROCEDURE — 3080F DIAST BP >= 90 MM HG: CPT | Performed by: NURSE PRACTITIONER

## 2024-03-29 RX ORDER — POLYETHYLENE GLYCOL 3350, SODIUM CHLORIDE, SODIUM BICARBONATE, POTASSIUM CHLORIDE 420; 11.2; 5.72; 1.48 G/4L; G/4L; G/4L; G/4L
4000 POWDER, FOR SOLUTION ORAL ONCE
Qty: 4000 ML | Refills: 0 | Status: SHIPPED | OUTPATIENT
Start: 2024-03-29 | End: 2024-03-29

## 2024-03-29 RX ORDER — DICLOFENAC SODIUM 10 MG/G
4 GEL TOPICAL AS NEEDED
COMMUNITY

## 2024-03-29 RX ORDER — POLYETHYLENE GLYCOL 3350 17 G/17G
238 POWDER, FOR SOLUTION ORAL ONCE
Qty: 238 G | Refills: 0 | Status: SHIPPED | OUTPATIENT
Start: 2024-03-29 | End: 2024-03-29

## 2024-03-29 ASSESSMENT — ENCOUNTER SYMPTOMS
SORE THROAT: 0
HEADACHES: 0
FATIGUE: 0
DYSURIA: 0
JOINT SWELLING: 0
HEMATURIA: 0
CHILLS: 0
FREQUENCY: 0
PHOTOPHOBIA: 0
NERVOUS/ANXIOUS: 0
NUMBNESS: 0
WEAKNESS: 0
PALPITATIONS: 0
FEVER: 0
BACK PAIN: 0
SHORTNESS OF BREATH: 0
DIAPHORESIS: 0
COUGH: 0
MYALGIAS: 0
LIGHT-HEADEDNESS: 0
ADENOPATHY: 0
FLANK PAIN: 0
WHEEZING: 0
ARTHRALGIAS: 0
HALLUCINATIONS: 0
EYE PAIN: 0
DIZZINESS: 0
AGITATION: 0

## 2024-03-29 NOTE — PATIENT INSTRUCTIONS
Thanks for coming to the GI clinic.     I would like to get a repeat colonoscopy. Please call [phone] to schedule.   Please read all of the instructions 7 days before your colonoscopy.   You will need to take ONLY clear liquids the ENTIRE day before your procedure. These include (clear fruit juices, soda, Gatorade, broth, jello and coffee/tea) Avoid red and purple drinks. No cream or milk in the coffee.   You will need to take an extended bowel preparation.   You will also need a .      Please do not take any NSAIDs (ibuprofen, aspirin, naproxen) until after completion of your colonoscopy.     Please get a stool test to check for inflammatory bowel disease.     You can use OTC loperamide (Imodium) as needed for diarrhea. You can use  up to 16 mg/day.     Follow up 3 weeks after completion of the above testing.

## 2024-03-29 NOTE — PROGRESS NOTES
Subjective   Patient ID: Christopher Lam is a 43 y.o. male who presents for follow up after testing.     This is a 43 year old AAM with history of obesity, FLORINA on CPAP, HTN, and suspected Schwannoma who is presenting to the GI clinic for follow up. Last seen in the GI clinic on 11/6/23.    Interval history:     EGD 2/15/24:   · Ectopic gastric mucosa in the esophagus   · The esophagus appeared normal.   · Multiple subcentimeter polyps in the fundus of the stomach and body of the stomach were removed with cold forceps biopsy   · The cardia, incisura, antrum, prepyloric region and pylorus appeared normal.   · The duodenum appeared normal. Performed random biopsy to rule out celiac disease.     Colonoscopy 2/15/24:   · There was significant amount of thick liquid stool and solid stool which obscured much of the underlying mucosa despite water lavage.   · Normal cecum, ileocecal valve, and appendiceal orifice (photodocumented).   · Mild, patchy abnormal mucosa with erosion in the terminal ileum; performed cold forceps biopsy. Multiple scattered erosions were noted in the terminal ileum, correlate with NSAID use.   · Multiple small, medium and large, scattered diverticula with no inflammation in the descending colon and sigmoid colon; no bleeding was identified   · Performed multiple forceps biopsies to rule out colitis. Multiple random cold biopsies were obtained from the right colon, left colon, and rectum to evaluate for microscopic colitis (prior biopsies were normal).   · Internal medium hemorrhoids observed during retroflexion; no bleeding was identified      A. DUODENAL BULB  BIOPSY:   Duodenal mucosa with intact villi, no significant pathologic change.      B. STOMACH BODY/CORPUS POLYPECTOMY:   Fundic gland polyp.   Findings suggestive of PPI medication effect.      C. TERMINAL ILEUM BIOPSY:   Acute ileitis with surface ulceration; see comment.      Comment: The biopsy shows small intestinal mucosa with acute  "inflammation, ulceration and focal architectural distortion.  No granulomas identified.  The differential diagnosis includes medication effect (such as NSAID), infection and inflammatory bowel disease.  Clinical correlation is necessary.      D.  RIGHT COLON  - RANDOM BIOPSY:   Colonic mucosa with no diagnostic changes seen.      Comment: No active inflammation or features of chronicity identified.      E.  LEFT COLON  - RANDOM BIOPSY:   Colonic mucosa with no significant histopathologic change.      Comment: No active inflammation or features of chronicity identified.      F. RECTUM BIOPSY:   Colorectal mucosa with no diagnostic changes seen.     Reports taking the entire Miralax Gatorade split bowel prep along with 2 Dulcolax tablets prior to the colonoscopy.       Denies any recent oral NSAID use. He took OTC Advil \"for 2 days tops\" in the middle of 1/2024.  He uses topical Voltaren on occasion for hip pain.     Reports continued watery diarrhea with urgency 5-6 times a day. Very rarely he has a solid stool. The diarrhea is mostly during the daytime. He wakes up at night with diarrhea on rare occasion.     He tried reducing intake of dairy, but it did not make a difference.     Denies unintentional weight loss, abdominal pain/discomfort, constipation, hematemesis, hematochezia, and melena.       Review of Systems   Constitutional:  Negative for chills, diaphoresis, fatigue and fever.   HENT:  Negative for congestion, ear pain, hearing loss, sneezing and sore throat.    Eyes:  Negative for photophobia, pain and visual disturbance.   Respiratory:  Negative for cough, shortness of breath and wheezing.    Cardiovascular:  Negative for chest pain, palpitations and leg swelling.   Endocrine: Negative for cold intolerance and heat intolerance.   Genitourinary:  Negative for dysuria, flank pain, frequency and hematuria.   Musculoskeletal:  Negative for arthralgias, back pain, gait problem, joint swelling and myalgias. "   Skin:  Negative for rash.   Neurological:  Negative for dizziness, syncope, weakness, light-headedness, numbness and headaches.   Hematological:  Negative for adenopathy.   Psychiatric/Behavioral:  Negative for agitation and hallucinations. The patient is not nervous/anxious.        Allergies   Allergen Reactions    Shellfish Derived Diarrhea and Nausea/vomiting     Current Outpatient Medications   Medication Sig Dispense Refill    amLODIPine-benazepriL (Lotrel) 10-40 mg capsule TAKE 1 CAPSULE BY MOUTH EVERY DAY 90 capsule 3    chlorthalidone (Hygroton) 25 mg tablet TAKE 1/2 TABLET BY MOUTH DAILY 45 tablet 2    diclofenac sodium (Voltaren) 1 % gel Apply 4.5 inches (4 g) topically if needed.      gabapentin (Neurontin) 600 mg tablet TAKE 1 TABLET BY MOUTH THREE TIMES A DAY 90 tablet 3    multivitamin tablet Take 1 tablet by mouth once daily.      NON FORMULARY Take 1 each by mouth once daily.  Emergen-C Five Oral Packet      omeprazole (PriLOSEC) 20 mg DR capsule Take 1 capsule (20 mg) by mouth once daily in the morning. Take before meals. 90 capsule 3    polyethylene glycol (Glycolax, Miralax) 17 gram/dose powder Take 238 g by mouth 1 time for 1 dose. Use as directed for extended bowel prep for colonoscopy 238 g 0    polyethylene glycol-electrolytes 420 gram solution Take 4,000 mL by mouth 1 time for 1 dose. Use as directed for extended bowel prep for colonoscopy 4000 mL 0     No current facility-administered medications for this visit.        Objective     BP (!) 156/104   Pulse 108   Temp 36.6 °C (97.9 °F)   Resp 18   Wt 123 kg (272 lb 2.5 oz)   SpO2 96%   BMI 37.96 kg/m²     Physical Exam  Constitutional:       General: He is not in acute distress.     Appearance: Normal appearance.   HENT:      Head: Normocephalic and atraumatic.   Eyes:      Conjunctiva/sclera: Conjunctivae normal.   Cardiovascular:      Rate and Rhythm: Normal rate and regular rhythm.      Heart sounds: No murmur heard.     No gallop.    Pulmonary:      Effort: Pulmonary effort is normal.      Breath sounds: Normal breath sounds.   Abdominal:      General: Bowel sounds are normal. There is no distension.      Tenderness: There is no abdominal tenderness. There is no guarding.   Musculoskeletal:         General: No swelling or deformity. Normal range of motion.      Cervical back: Normal range of motion. No rigidity.   Skin:     General: Skin is warm and dry.      Coloration: Skin is not jaundiced.      Findings: No lesion or rash.   Neurological:      General: No focal deficit present.      Mental Status: He is alert and oriented to person, place, and time.   Psychiatric:         Mood and Affect: Mood normal.         Assessment/Plan   Problem List Items Addressed This Visit       Chronic diarrhea - Primary    Relevant Medications    polyethylene glycol-electrolytes 420 gram solution    polyethylene glycol (Glycolax, Miralax) 17 gram/dose powder    Other Relevant Orders    Calprotectin, Fecal    Colonoscopy Diagnostic     Other Visit Diagnoses       Ileitis        Relevant Medications    polyethylene glycol-electrolytes 420 gram solution    polyethylene glycol (Glycolax, Miralax) 17 gram/dose powder    Other Relevant Orders    Calprotectin, Fecal    Colonoscopy Diagnostic           Chronic diarrhea with terminal ileitis on recent colonoscopy : etiology not readily clear. Consider NSAID related enteropathy, but he was very rarely taking Advil prior to the colonoscopy. With previously elevated fecal calprotectin, would still consider small bowel Crohn's disease albeit ileal biopsy did not note any chronic findings.   - repeat colonoscopy with biopsy of terminal ileum   - Extended Split Dose Miralax Nulytely bowel prep   - recheck fecal calprotectin   - recommend complete avoidance of NSAIDs prior to colonoscopy   - recommend OTC loperamide (except when prepping for colonoscopy)  - will consider CT enterography pending results of the above     2.  Colorectal cancer screening: bowel prep was inadequate for screening with recent colonoscopy  - repeat colonoscopy with extended bowel prep as above     3. Follow up:  - return to clinic 3 weeks after completion of the above testing

## 2024-04-22 NOTE — PROGRESS NOTES
Subjective   Patient ID: Christopher Lam is a 43 y.o. male who presents for left-sided abdominal pain    HPI   The patient reports a history of constant crampy or sharp pain in the left lower quadrant since the morning of April 19.  He does report an increase in the intensity of the pain when pressure is applied to the left lower quadrant or when he lies on his left side.  He also reports an increase in the intensity the pain with oral intake.  He reports a transient decrease in the intensity of pain with passage of flatus.  Also, since April 20 he reports a decrease in the frequency of bowel movements with passage of more solid stool.  Since April 20, he reports a history of near constant generalized abdominal bloating which is precipitated and increased in intensity with oral intake and has not been affected by passage of flatus, defecation.  He does report a history of a decrease in appetite beginning April 19.  He reports no fever, chills, nausea, vomiting, melena, hematochezia, change in urinary habits.    Over the past 1 to 2 days, he does report some decrease in the intensity of pain, more frequent bowel movements consisting of softer solid stools, and a decrease in the intensity of bloating.  Review of Systems    Objective   There were no vitals taken for this visit.    Physical Exam  Abdomen-soft,obese.  Hypoactive bowel sounds.  There is a 3 x 3 cm mass in the umbilical region.  Palpation revealed no tenderness or increase in warmth..    Palpation also revealed moderate tenderness in the left lower quadrant, no rebound tenderness or other masses noted..    Assessment/Plan        Assessment  Constant sharp cramping or sharp pain in the left lower quadrant, near constant generalized abdominal bloating-May be secondary to acute diverticulitis  Decreased frequency of bowel movements with passage of more solid stool-May be secondary to acute diverticulitis  Anorexia-May be secondary to acute  diverticulitis  Plan  Obtain CBC differential, CMP, CRP today.  Begin Augmentin 875 mg p.o. twice daily x 10 days.  I did urge the patient to push p.o. fluid intake is much as possible  I told the patient to call me in 10 days with his condition or sooner if he develops side effects from the Augmentin or if he develops additional symptoms.

## 2024-04-23 ENCOUNTER — OFFICE VISIT (OUTPATIENT)
Dept: PRIMARY CARE | Facility: CLINIC | Age: 44
End: 2024-04-23
Payer: COMMERCIAL

## 2024-04-23 ENCOUNTER — LAB (OUTPATIENT)
Dept: LAB | Facility: LAB | Age: 44
End: 2024-04-23
Payer: COMMERCIAL

## 2024-04-23 ENCOUNTER — TELEPHONE (OUTPATIENT)
Dept: GASTROENTEROLOGY | Facility: HOSPITAL | Age: 44
End: 2024-04-23

## 2024-04-23 VITALS — HEART RATE: 94 BPM | SYSTOLIC BLOOD PRESSURE: 120 MMHG | DIASTOLIC BLOOD PRESSURE: 84 MMHG

## 2024-04-23 DIAGNOSIS — K52.9 ILEITIS: ICD-10-CM

## 2024-04-23 DIAGNOSIS — K57.92 ACUTE DIVERTICULITIS: ICD-10-CM

## 2024-04-23 DIAGNOSIS — I10 BENIGN ESSENTIAL HYPERTENSION: ICD-10-CM

## 2024-04-23 DIAGNOSIS — K57.92 ACUTE DIVERTICULITIS: Primary | ICD-10-CM

## 2024-04-23 DIAGNOSIS — K52.9 CHRONIC DIARRHEA: ICD-10-CM

## 2024-04-23 LAB
ALBUMIN SERPL BCP-MCNC: 4.2 G/DL (ref 3.4–5)
ALP SERPL-CCNC: 77 U/L (ref 33–120)
ALT SERPL W P-5'-P-CCNC: 56 U/L (ref 10–52)
ANION GAP SERPL CALC-SCNC: 15 MMOL/L (ref 10–20)
AST SERPL W P-5'-P-CCNC: 26 U/L (ref 9–39)
BASOPHILS # BLD AUTO: 0.04 X10*3/UL (ref 0–0.1)
BASOPHILS NFR BLD AUTO: 0.5 %
BILIRUB SERPL-MCNC: 0.3 MG/DL (ref 0–1.2)
BUN SERPL-MCNC: 12 MG/DL (ref 6–23)
CALCIUM SERPL-MCNC: 9.7 MG/DL (ref 8.6–10.6)
CHLORIDE SERPL-SCNC: 101 MMOL/L (ref 98–107)
CO2 SERPL-SCNC: 28 MMOL/L (ref 21–32)
CREAT SERPL-MCNC: 1.01 MG/DL (ref 0.5–1.3)
CRP SERPL-MCNC: 2.42 MG/DL
EGFRCR SERPLBLD CKD-EPI 2021: >90 ML/MIN/1.73M*2
EOSINOPHIL # BLD AUTO: 0.08 X10*3/UL (ref 0–0.7)
EOSINOPHIL NFR BLD AUTO: 1 %
ERYTHROCYTE [DISTWIDTH] IN BLOOD BY AUTOMATED COUNT: 14.6 % (ref 11.5–14.5)
GLUCOSE SERPL-MCNC: 104 MG/DL (ref 74–99)
HCT VFR BLD AUTO: 43 % (ref 41–52)
HGB BLD-MCNC: 13.8 G/DL (ref 13.5–17.5)
IMM GRANULOCYTES # BLD AUTO: 0.02 X10*3/UL (ref 0–0.7)
IMM GRANULOCYTES NFR BLD AUTO: 0.2 % (ref 0–0.9)
LYMPHOCYTES # BLD AUTO: 2.91 X10*3/UL (ref 1.2–4.8)
LYMPHOCYTES NFR BLD AUTO: 36.2 %
MCH RBC QN AUTO: 28.3 PG (ref 26–34)
MCHC RBC AUTO-ENTMCNC: 32.1 G/DL (ref 32–36)
MCV RBC AUTO: 88 FL (ref 80–100)
MONOCYTES # BLD AUTO: 0.79 X10*3/UL (ref 0.1–1)
MONOCYTES NFR BLD AUTO: 9.8 %
NEUTROPHILS # BLD AUTO: 4.2 X10*3/UL (ref 1.2–7.7)
NEUTROPHILS NFR BLD AUTO: 52.3 %
NRBC BLD-RTO: 0 /100 WBCS (ref 0–0)
PLATELET # BLD AUTO: 432 X10*3/UL (ref 150–450)
POTASSIUM SERPL-SCNC: 3.5 MMOL/L (ref 3.5–5.3)
PROT SERPL-MCNC: 7 G/DL (ref 6.4–8.2)
RBC # BLD AUTO: 4.87 X10*6/UL (ref 4.5–5.9)
SODIUM SERPL-SCNC: 140 MMOL/L (ref 136–145)
WBC # BLD AUTO: 8 X10*3/UL (ref 4.4–11.3)

## 2024-04-23 PROCEDURE — 3074F SYST BP LT 130 MM HG: CPT | Performed by: INTERNAL MEDICINE

## 2024-04-23 PROCEDURE — 3078F DIAST BP <80 MM HG: CPT | Performed by: INTERNAL MEDICINE

## 2024-04-23 PROCEDURE — 36415 COLL VENOUS BLD VENIPUNCTURE: CPT

## 2024-04-23 PROCEDURE — 86140 C-REACTIVE PROTEIN: CPT

## 2024-04-23 PROCEDURE — 85025 COMPLETE CBC W/AUTO DIFF WBC: CPT

## 2024-04-23 PROCEDURE — 80053 COMPREHEN METABOLIC PANEL: CPT

## 2024-04-23 PROCEDURE — 99213 OFFICE O/P EST LOW 20 MIN: CPT | Performed by: INTERNAL MEDICINE

## 2024-04-23 RX ORDER — AMOXICILLIN AND CLAVULANATE POTASSIUM 875; 125 MG/1; MG/1
875 TABLET, FILM COATED ORAL 2 TIMES DAILY
Qty: 20 TABLET | Refills: 0 | Status: SHIPPED | OUTPATIENT
Start: 2024-04-23 | End: 2024-05-03

## 2024-05-07 DIAGNOSIS — K92.1 HEMATOCHEZIA: Primary | ICD-10-CM

## 2024-05-15 DIAGNOSIS — E66.01 CLASS 2 SEVERE OBESITY DUE TO EXCESS CALORIES WITH SERIOUS COMORBIDITY AND BODY MASS INDEX (BMI) OF 37.0 TO 37.9 IN ADULT (MULTI): Primary | ICD-10-CM

## 2024-05-16 ENCOUNTER — TELEPHONE (OUTPATIENT)
Dept: PRIMARY CARE | Facility: CLINIC | Age: 44
End: 2024-05-16
Payer: COMMERCIAL

## 2024-05-16 RX ORDER — SEMAGLUTIDE 0.68 MG/ML
0.25 INJECTION, SOLUTION SUBCUTANEOUS
Qty: 3 ML | Refills: 2 | Status: SHIPPED | OUTPATIENT
Start: 2024-05-16

## 2024-05-16 NOTE — TELEPHONE ENCOUNTER
Patient states  he spoke with you yesterday about sending ozempic to his pharmacy but his pharmacy hasn't received it. I do see a message from the patient to you yesterday but no response. Were we starting Mr. Lam on ozempic?

## 2024-07-19 ENCOUNTER — APPOINTMENT (OUTPATIENT)
Dept: GASTROENTEROLOGY | Facility: HOSPITAL | Age: 44
End: 2024-07-19
Payer: COMMERCIAL

## 2024-07-28 DIAGNOSIS — I10 BENIGN ESSENTIAL HYPERTENSION: ICD-10-CM

## 2024-07-28 DIAGNOSIS — K21.9 GASTROESOPHAGEAL REFLUX DISEASE, UNSPECIFIED WHETHER ESOPHAGITIS PRESENT: ICD-10-CM

## 2024-07-29 RX ORDER — AMLODIPINE AND BENAZEPRIL HYDROCHLORIDE 10; 40 MG/1; MG/1
1 CAPSULE ORAL DAILY
Qty: 90 CAPSULE | Refills: 3 | Status: SHIPPED | OUTPATIENT
Start: 2024-07-29

## 2024-07-29 RX ORDER — OMEPRAZOLE 20 MG/1
20 CAPSULE, DELAYED RELEASE ORAL
Qty: 90 CAPSULE | Refills: 3 | Status: SHIPPED | OUTPATIENT
Start: 2024-07-29

## 2024-08-10 DIAGNOSIS — E66.01 CLASS 2 SEVERE OBESITY DUE TO EXCESS CALORIES WITH SERIOUS COMORBIDITY AND BODY MASS INDEX (BMI) OF 37.0 TO 37.9 IN ADULT (MULTI): ICD-10-CM

## 2024-08-12 RX ORDER — SEMAGLUTIDE 0.68 MG/ML
0.25 INJECTION, SOLUTION SUBCUTANEOUS
Qty: 2 ML | Refills: 2 | Status: SHIPPED | OUTPATIENT
Start: 2024-08-12

## 2024-09-25 NOTE — PROGRESS NOTES
Subjective   Patient ID: Christopher Lam is a 43 y.o. male who presents for No chief complaint on file..    HPI   Over the past few months, the patient reports that he has noted an increase in the size of his umbilical hernia.  He reports no associated pain at the site.  Since beginning use of Ozempic in May or June, the patient has lost 30 pounds.  Also, since beginning use of Ozempic he has experienced no diarrhea.  He reports no other new complaints.  Review of Systems    Objective   There were no vitals taken for this visit.    Physical Exam  Lungs-clear  Cardiac-rate normal, rhythm regular, no murmurs, no JVD  Abdomen soft,obese.  Normal active bowel sounds.  There is a 15 x 15 cm mass in the epigastrium.  There is also a 3 x 4 cm mass in the umbilical region.  Palpation of both regions revealed no tenderness or increase in warmth, no tenderness or masses noted throughout the rest of the abdomen  Extremities-no peripheral edema  Assessment/Plan   Problem List Items Addressed This Visit             ICD-10-CM    Benign essential hypertension I10    Relevant Orders    CBC and Auto Differential    Comprehensive Metabolic Panel    C-Reactive Protein, High Sensitivity    Lipid Panel    Prostate Specific Antigen    Vitamin B12    Vitamin D 25-Hydroxy,Total (for eval of Vitamin D levels)    Testosterone,Free and Total    Esophageal reflux K21.9    Relevant Orders    CBC and Auto Differential    Comprehensive Metabolic Panel    C-Reactive Protein, High Sensitivity    Lipid Panel    Prostate Specific Antigen    Vitamin B12    Vitamin D 25-Hydroxy,Total (for eval of Vitamin D levels)    Testosterone,Free and Total    FLORINA (obstructive sleep apnea) G47.33    Relevant Orders    CBC and Auto Differential    Comprehensive Metabolic Panel    C-Reactive Protein, High Sensitivity    Lipid Panel    Prostate Specific Antigen    Vitamin B12    Vitamin D 25-Hydroxy,Total (for eval of Vitamin D levels)    Testosterone,Free and Total     Class 2 obesity with body mass index (BMI) of 35 to 39.9 without comorbidity E66.9    Relevant Orders    CBC and Auto Differential    Comprehensive Metabolic Panel    C-Reactive Protein, High Sensitivity    Lipid Panel    Prostate Specific Antigen    Vitamin B12    Vitamin D 25-Hydroxy,Total (for eval of Vitamin D levels)    Testosterone,Free and Total     Other Visit Diagnoses         Codes    Decreased libido    -  Primary R68.82    Relevant Orders    CBC and Auto Differential    Comprehensive Metabolic Panel    C-Reactive Protein, High Sensitivity    Lipid Panel    Prostate Specific Antigen    Vitamin B12    Vitamin D 25-Hydroxy,Total (for eval of Vitamin D levels)    Testosterone,Free and Total    Ventral hernia without obstruction or gangrene     K43.9    Relevant Orders    Referral to General Surgery    Umbilical hernia without obstruction and without gangrene     K42.9    Relevant Orders    Referral to General Surgery             Assessment  Hypertension-second blood pressure at goal  Presence of a mass in the epigastrium-likely represents a ventral hernia  Chronic presence of a mass in the umbilical region-likely represents an umbilical hernia  Acute diverticulitis April 23, 2024  Vitamin D deficiency  Schwannoma at L3  Central canal stenosis L3-L4  Bilateral neuroforaminal stenosis L5-S1  Plan  Refer to general surgeon for further evaluation and treatment of the aforementioned hernias  Obtain CBC differential, CMP, fasting lipid profile, TSH, vitamin D level, vitamin B12 level, cardiac CRP, PSA, urinalysis is soon as possible.  I recommended that the patient discontinue chlorthalidone and change his omeprazole dosage to 20 mg p.o. every other day.  He will return for a blood pressure check in 4 to 6 weeks

## 2024-09-26 ENCOUNTER — APPOINTMENT (OUTPATIENT)
Dept: PRIMARY CARE | Facility: CLINIC | Age: 44
End: 2024-09-26
Payer: COMMERCIAL

## 2024-09-26 VITALS
WEIGHT: 237.6 LBS | BODY MASS INDEX: 33.14 KG/M2 | HEART RATE: 94 BPM | DIASTOLIC BLOOD PRESSURE: 78 MMHG | SYSTOLIC BLOOD PRESSURE: 100 MMHG

## 2024-09-26 DIAGNOSIS — K42.9 UMBILICAL HERNIA WITHOUT OBSTRUCTION AND WITHOUT GANGRENE: ICD-10-CM

## 2024-09-26 DIAGNOSIS — G47.33 OSA (OBSTRUCTIVE SLEEP APNEA): ICD-10-CM

## 2024-09-26 DIAGNOSIS — K21.9 GASTROESOPHAGEAL REFLUX DISEASE WITHOUT ESOPHAGITIS: ICD-10-CM

## 2024-09-26 DIAGNOSIS — R68.82 DECREASED LIBIDO: Primary | ICD-10-CM

## 2024-09-26 DIAGNOSIS — I10 BENIGN ESSENTIAL HYPERTENSION: ICD-10-CM

## 2024-09-26 DIAGNOSIS — E66.9 CLASS 2 OBESITY WITH BODY MASS INDEX (BMI) OF 35 TO 39.9 WITHOUT COMORBIDITY: ICD-10-CM

## 2024-09-26 DIAGNOSIS — K43.9 VENTRAL HERNIA WITHOUT OBSTRUCTION OR GANGRENE: ICD-10-CM

## 2024-09-26 PROCEDURE — 99213 OFFICE O/P EST LOW 20 MIN: CPT | Performed by: INTERNAL MEDICINE

## 2024-09-26 PROCEDURE — 3078F DIAST BP <80 MM HG: CPT | Performed by: INTERNAL MEDICINE

## 2024-09-26 PROCEDURE — 3074F SYST BP LT 130 MM HG: CPT | Performed by: INTERNAL MEDICINE

## 2024-09-27 ENCOUNTER — LAB (OUTPATIENT)
Dept: LAB | Facility: LAB | Age: 44
End: 2024-09-27
Payer: COMMERCIAL

## 2024-09-27 DIAGNOSIS — G47.33 OSA (OBSTRUCTIVE SLEEP APNEA): ICD-10-CM

## 2024-09-27 DIAGNOSIS — E66.9 CLASS 2 OBESITY WITH BODY MASS INDEX (BMI) OF 35 TO 39.9 WITHOUT COMORBIDITY: ICD-10-CM

## 2024-09-27 DIAGNOSIS — K21.9 GASTROESOPHAGEAL REFLUX DISEASE WITHOUT ESOPHAGITIS: ICD-10-CM

## 2024-09-27 DIAGNOSIS — I10 BENIGN ESSENTIAL HYPERTENSION: ICD-10-CM

## 2024-09-27 DIAGNOSIS — R68.82 DECREASED LIBIDO: ICD-10-CM

## 2024-09-27 LAB
25(OH)D3 SERPL-MCNC: 49 NG/ML (ref 30–100)
ALBUMIN SERPL BCP-MCNC: 4 G/DL (ref 3.4–5)
ALP SERPL-CCNC: 80 U/L (ref 33–120)
ALT SERPL W P-5'-P-CCNC: 31 U/L (ref 10–52)
ANION GAP SERPL CALC-SCNC: 12 MMOL/L (ref 10–20)
APPEARANCE UR: CLEAR
AST SERPL W P-5'-P-CCNC: 17 U/L (ref 9–39)
BASOPHILS # BLD AUTO: 0.03 X10*3/UL (ref 0–0.1)
BASOPHILS NFR BLD AUTO: 0.6 %
BILIRUB SERPL-MCNC: 0.4 MG/DL (ref 0–1.2)
BILIRUB UR STRIP.AUTO-MCNC: NEGATIVE MG/DL
BUN SERPL-MCNC: 16 MG/DL (ref 6–23)
CALCIUM SERPL-MCNC: 9.8 MG/DL (ref 8.6–10.6)
CHLORIDE SERPL-SCNC: 99 MMOL/L (ref 98–107)
CHOLEST SERPL-MCNC: 152 MG/DL (ref 0–199)
CHOLESTEROL/HDL RATIO: 4.4
CO2 SERPL-SCNC: 33 MMOL/L (ref 21–32)
COLOR UR: YELLOW
CREAT SERPL-MCNC: 1.39 MG/DL (ref 0.5–1.3)
CRP SERPL HS-MCNC: 14 MG/L
EGFRCR SERPLBLD CKD-EPI 2021: 64 ML/MIN/1.73M*2
EOSINOPHIL # BLD AUTO: 0.03 X10*3/UL (ref 0–0.7)
EOSINOPHIL NFR BLD AUTO: 0.6 %
ERYTHROCYTE [DISTWIDTH] IN BLOOD BY AUTOMATED COUNT: 13.6 % (ref 11.5–14.5)
GLUCOSE SERPL-MCNC: 102 MG/DL (ref 74–99)
GLUCOSE UR STRIP.AUTO-MCNC: NORMAL MG/DL
HCT VFR BLD AUTO: 43.8 % (ref 41–52)
HDLC SERPL-MCNC: 34.9 MG/DL
HGB BLD-MCNC: 14.4 G/DL (ref 13.5–17.5)
IMM GRANULOCYTES # BLD AUTO: 0.01 X10*3/UL (ref 0–0.7)
IMM GRANULOCYTES NFR BLD AUTO: 0.2 % (ref 0–0.9)
KETONES UR STRIP.AUTO-MCNC: NEGATIVE MG/DL
LDLC SERPL CALC-MCNC: 101 MG/DL
LEUKOCYTE ESTERASE UR QL STRIP.AUTO: NEGATIVE
LYMPHOCYTES # BLD AUTO: 2.2 X10*3/UL (ref 1.2–4.8)
LYMPHOCYTES NFR BLD AUTO: 43.4 %
MCH RBC QN AUTO: 29.3 PG (ref 26–34)
MCHC RBC AUTO-ENTMCNC: 32.9 G/DL (ref 32–36)
MCV RBC AUTO: 89 FL (ref 80–100)
MONOCYTES # BLD AUTO: 0.5 X10*3/UL (ref 0.1–1)
MONOCYTES NFR BLD AUTO: 9.9 %
MUCOUS THREADS #/AREA URNS AUTO: NORMAL /LPF
NEUTROPHILS # BLD AUTO: 2.3 X10*3/UL (ref 1.2–7.7)
NEUTROPHILS NFR BLD AUTO: 45.3 %
NITRITE UR QL STRIP.AUTO: NEGATIVE
NON HDL CHOLESTEROL: 117 MG/DL (ref 0–149)
NRBC BLD-RTO: 0 /100 WBCS (ref 0–0)
PH UR STRIP.AUTO: 6.5 [PH]
PLATELET # BLD AUTO: 427 X10*3/UL (ref 150–450)
POTASSIUM SERPL-SCNC: 4.3 MMOL/L (ref 3.5–5.3)
PROT SERPL-MCNC: 6.8 G/DL (ref 6.4–8.2)
PROT UR STRIP.AUTO-MCNC: NORMAL MG/DL
PSA SERPL-MCNC: 0.46 NG/ML
RBC # BLD AUTO: 4.92 X10*6/UL (ref 4.5–5.9)
RBC # UR STRIP.AUTO: NEGATIVE /UL
RBC #/AREA URNS AUTO: NORMAL /HPF
SODIUM SERPL-SCNC: 140 MMOL/L (ref 136–145)
SP GR UR STRIP.AUTO: 1.03
TRIGL SERPL-MCNC: 81 MG/DL (ref 0–149)
UROBILINOGEN UR STRIP.AUTO-MCNC: NORMAL MG/DL
VIT B12 SERPL-MCNC: 962 PG/ML (ref 211–911)
VLDL: 16 MG/DL (ref 0–40)
WBC # BLD AUTO: 5.1 X10*3/UL (ref 4.4–11.3)
WBC #/AREA URNS AUTO: NORMAL /HPF

## 2024-09-27 PROCEDURE — 82607 VITAMIN B-12: CPT

## 2024-09-27 PROCEDURE — 36415 COLL VENOUS BLD VENIPUNCTURE: CPT

## 2024-09-27 PROCEDURE — 81001 URINALYSIS AUTO W/SCOPE: CPT

## 2024-09-27 PROCEDURE — 84153 ASSAY OF PSA TOTAL: CPT

## 2024-09-27 PROCEDURE — 84402 ASSAY OF FREE TESTOSTERONE: CPT

## 2024-09-27 PROCEDURE — 80053 COMPREHEN METABOLIC PANEL: CPT

## 2024-09-27 PROCEDURE — 85025 COMPLETE CBC W/AUTO DIFF WBC: CPT

## 2024-09-27 PROCEDURE — 80061 LIPID PANEL: CPT

## 2024-09-27 PROCEDURE — 86141 C-REACTIVE PROTEIN HS: CPT

## 2024-09-27 PROCEDURE — 82306 VITAMIN D 25 HYDROXY: CPT

## 2024-09-30 DIAGNOSIS — I10 BENIGN ESSENTIAL HYPERTENSION: Primary | ICD-10-CM

## 2024-09-30 DIAGNOSIS — E66.812 CLASS 2 OBESITY WITH BODY MASS INDEX (BMI) OF 35 TO 39.9 WITHOUT COMORBIDITY: ICD-10-CM

## 2024-09-30 LAB
TESTOSTERONE FREE (CHAN): 42.6 PG/ML (ref 35–155)
TESTOSTERONE,TOTAL,LC-MS/MS: 213 NG/DL (ref 250–1100)

## 2024-10-21 ENCOUNTER — OFFICE VISIT (OUTPATIENT)
Dept: SURGERY | Facility: CLINIC | Age: 44
End: 2024-10-21
Payer: COMMERCIAL

## 2024-10-21 VITALS
HEIGHT: 71 IN | BODY MASS INDEX: 34.16 KG/M2 | WEIGHT: 244 LBS | DIASTOLIC BLOOD PRESSURE: 87 MMHG | TEMPERATURE: 98.6 F | HEART RATE: 88 BPM | SYSTOLIC BLOOD PRESSURE: 137 MMHG

## 2024-10-21 DIAGNOSIS — K43.9 VENTRAL HERNIA WITHOUT OBSTRUCTION OR GANGRENE: Primary | ICD-10-CM

## 2024-10-21 DIAGNOSIS — K42.9 UMBILICAL HERNIA WITHOUT OBSTRUCTION AND WITHOUT GANGRENE: ICD-10-CM

## 2024-10-21 DIAGNOSIS — E66.812 CLASS 2 OBESITY WITH BODY MASS INDEX (BMI) OF 35 TO 39.9 WITHOUT COMORBIDITY: ICD-10-CM

## 2024-10-21 PROCEDURE — 99203 OFFICE O/P NEW LOW 30 MIN: CPT | Performed by: SURGERY

## 2024-10-21 PROCEDURE — 99213 OFFICE O/P EST LOW 20 MIN: CPT | Performed by: SURGERY

## 2024-10-21 PROCEDURE — 3008F BODY MASS INDEX DOCD: CPT | Performed by: SURGERY

## 2024-10-21 PROCEDURE — 3079F DIAST BP 80-89 MM HG: CPT | Performed by: SURGERY

## 2024-10-21 PROCEDURE — 3075F SYST BP GE 130 - 139MM HG: CPT | Performed by: SURGERY

## 2024-10-21 RX ORDER — SEMAGLUTIDE 1.34 MG/ML
1 INJECTION, SOLUTION SUBCUTANEOUS
Qty: 3 ML | Refills: 3 | Status: SHIPPED | OUTPATIENT
Start: 2024-10-21

## 2024-10-21 ASSESSMENT — ENCOUNTER SYMPTOMS
NAUSEA: 0
DEPRESSION: 0
ABDOMINAL PAIN: 0
DIARRHEA: 0
SHORTNESS OF BREATH: 0
CONSTIPATION: 0
VOMITING: 0

## 2024-10-21 ASSESSMENT — PAIN SCALES - GENERAL: PAINLEVEL_OUTOF10: 2

## 2024-10-21 NOTE — H&P (VIEW-ONLY)
"  Subjective   Patient ID: Christopher Lam is a 43 y.o. male with a history of hypertension referred  for ventral hernias. The patient states he has had a umbilical hernia for ~1 year but he has been noticing it more recently because he lost 30 pounds on Ozempic.     He has also noted a bulge in the epigastrium when he does a sit up.  The patient states it is cosmetically bothersome. Patient states that the umbilical hernia  is \"tender\" when he presses hard on it but otherwise denies pain the area.      Past medical history  - Hypertension    Past surgical history:  - \"Groin hernia\" repair at age 3     Family history:  - Pancreatic cancer on maternal and paternal side  - \"Blood cancer\" in father  - Hypertension in mother and father     Medications  - Semaglutide (Ozempic) 1mg weekly  - Omeprazole 20mg daily  - Amlodipine-benazepril 10-40mg daily     HPI  Patient has never smoked tobacco or used smokeless tobacco. Occasional alcohol use.     Allergies: none     Review of Systems   Respiratory:  Negative for shortness of breath.    Cardiovascular:  Negative for chest pain.   Gastrointestinal:  Negative for abdominal pain, constipation, diarrhea, nausea and vomiting.       Past Medical History:   Diagnosis Date    Personal history of other diseases of the circulatory system     History of hypertension    Sleep apnea         Past Surgical History:   Procedure Laterality Date    OTHER SURGICAL HISTORY  10/04/2022    No history of surgery            Objective     Physical Exam  HENT:      Head: Normocephalic and atraumatic.   Cardiovascular:      Rate and Rhythm: Normal rate and regular rhythm.   Pulmonary:      Effort: Pulmonary effort is normal.      Breath sounds: Normal breath sounds.   Abdominal:      Hernia: A hernia (Small to moderate-sized umbilical hernia that reduces easily.  When sitting up has significant diastases recti in the epigastrium, no fascial defects noted on exam) is present. "             Assessment/Plan   Diagnoses and all orders for this visit:  Ventral hernia without obstruction or gangrene  -     Referral to General Surgery  Umbilical hernia without obstruction and without gangrene  -     Referral to General Surgery  -     Case Request Operating Room: Repair Umbilical Hernia; Standing  Other orders  -     Place in outpatient/hospital ambulatory surgery; Standing  -     Full code; Standing  -     NPO Diet Except: Sips with meds; Effective now; Standing  -     Height and weight; Standing  -     Insert and maintain peripheral IV; Standing  -     Saline lock IV; Standing  -     Type And Screen; Standing  -     Inpatient consult to Respiratory Care; Standing    Impression;  Umbilical hernia, with significant diastasis recti.       Informed him that in general diastases recti does not lend itself to surgical repair but rather exercise and physical therapy may help    Regarding his umbilical hernia I do recommend umbilical hernia repair (with or without mesh).  We discussed the procedure to include risk benefits alternatives.  He agrees to the operation which is tentatively scheduled for later this month

## 2024-10-21 NOTE — LETTER
"October 21, 2024     John Blair MD  [address] [facility] [address]    Patient: Christopher Lam   YOB: 1980   Date of Visit: 10/21/2024       Dear Dr. John Blair MD:    Thank you for referring Christopher Lam to me for evaluation. Below are my notes for this consultation.  If you have questions, please do not hesitate to call me. I look forward to following your patient along with you.       Sincerely,     Tj Hernandez MD      CC: No Recipients  ______________________________________________________________________________________      Subjective  Patient ID: Christopher Lam is a 43 y.o. male with a history of hypertension referred  for ventral hernias. The patient states he has had a umbilical hernia for ~1 year but he has been noticing it more recently because he lost 30 pounds on Ozempic.     He has also noted a bulge in the epigastrium when he does a sit up.  The patient states it is cosmetically bothersome. Patient states that the umbilical hernia  is \"tender\" when he presses hard on it but otherwise denies pain the area.      Past medical history  - Hypertension    Past surgical history:  - \"Groin hernia\" repair at age 3     Family history:  - Pancreatic cancer on maternal and paternal side  - \"Blood cancer\" in father  - Hypertension in mother and father     Medications  - Semaglutide (Ozempic) 1mg weekly  - Omeprazole 20mg daily  - Amlodipine-benazepril 10-40mg daily     HPI  Patient has never smoked tobacco or used smokeless tobacco. Occasional alcohol use.     Allergies: none     Review of Systems   Respiratory:  Negative for shortness of breath.    Cardiovascular:  Negative for chest pain.   Gastrointestinal:  Negative for abdominal pain, constipation, diarrhea, nausea and vomiting.       Past Medical History:   Diagnosis Date   • Personal history of other diseases of the circulatory system     History of hypertension   • Sleep apnea         Past Surgical History:   Procedure " Laterality Date   • OTHER SURGICAL HISTORY  10/04/2022    No history of surgery            Objective    Physical Exam  HENT:      Head: Normocephalic and atraumatic.   Cardiovascular:      Rate and Rhythm: Normal rate and regular rhythm.   Pulmonary:      Effort: Pulmonary effort is normal.      Breath sounds: Normal breath sounds.   Abdominal:      Hernia: A hernia (Small to moderate-sized umbilical hernia that reduces easily.  When sitting up has significant diastases recti in the epigastrium, no fascial defects noted on exam) is present.             Assessment/Plan  Diagnoses and all orders for this visit:  Ventral hernia without obstruction or gangrene  -     Referral to General Surgery  Umbilical hernia without obstruction and without gangrene  -     Referral to General Surgery  -     Case Request Operating Room: Repair Umbilical Hernia; Standing  Other orders  -     Place in outpatient/hospital ambulatory surgery; Standing  -     Full code; Standing  -     NPO Diet Except: Sips with meds; Effective now; Standing  -     Height and weight; Standing  -     Insert and maintain peripheral IV; Standing  -     Saline lock IV; Standing  -     Type And Screen; Standing  -     Inpatient consult to Respiratory Care; Standing    Impression;  Umbilical hernia, with significant diastasis recti.       Informed him that in general diastases recti does not lend itself to surgical repair but rather exercise and physical therapy may help    Regarding his umbilical hernia I do recommend umbilical hernia repair (with or without mesh).  We discussed the procedure to include risk benefits alternatives.  He agrees to the operation which is tentatively scheduled for later this month

## 2024-11-05 ENCOUNTER — HOSPITAL ENCOUNTER (OUTPATIENT)
Facility: HOSPITAL | Age: 44
Setting detail: OUTPATIENT SURGERY
Discharge: HOME | End: 2024-11-05
Attending: SURGERY | Admitting: SURGERY
Payer: COMMERCIAL

## 2024-11-05 ENCOUNTER — ANESTHESIA EVENT (OUTPATIENT)
Dept: OPERATING ROOM | Facility: HOSPITAL | Age: 44
End: 2024-11-05
Payer: COMMERCIAL

## 2024-11-05 ENCOUNTER — ANESTHESIA (OUTPATIENT)
Dept: OPERATING ROOM | Facility: HOSPITAL | Age: 44
End: 2024-11-05
Payer: COMMERCIAL

## 2024-11-05 VITALS
WEIGHT: 236.99 LBS | BODY MASS INDEX: 33.18 KG/M2 | OXYGEN SATURATION: 100 % | HEIGHT: 71 IN | TEMPERATURE: 97.2 F | SYSTOLIC BLOOD PRESSURE: 121 MMHG | RESPIRATION RATE: 20 BRPM | HEART RATE: 87 BPM | DIASTOLIC BLOOD PRESSURE: 76 MMHG

## 2024-11-05 DIAGNOSIS — K42.9 UMBILICAL HERNIA WITHOUT OBSTRUCTION AND WITHOUT GANGRENE: ICD-10-CM

## 2024-11-05 DIAGNOSIS — K43.9 VENTRAL HERNIA WITHOUT OBSTRUCTION OR GANGRENE: Primary | ICD-10-CM

## 2024-11-05 PROCEDURE — 2500000005 HC RX 250 GENERAL PHARMACY W/O HCPCS: Performed by: SURGERY

## 2024-11-05 PROCEDURE — 2500000005 HC RX 250 GENERAL PHARMACY W/O HCPCS: Performed by: ANESTHESIOLOGIST ASSISTANT

## 2024-11-05 PROCEDURE — 49593 RPR AA HRN 1ST 3-10 RDC: CPT | Performed by: SURGERY

## 2024-11-05 PROCEDURE — A49593 PR RPR AA HERNIA 1ST 3-10 CM REDUCIBLE: Performed by: ANESTHESIOLOGIST ASSISTANT

## 2024-11-05 PROCEDURE — 3700000001 HC GENERAL ANESTHESIA TIME - INITIAL BASE CHARGE: Performed by: SURGERY

## 2024-11-05 PROCEDURE — 3600000008 HC OR TIME - EACH INCREMENTAL 1 MINUTE - PROCEDURE LEVEL THREE: Performed by: SURGERY

## 2024-11-05 PROCEDURE — 2780000003 HC OR 278 NO HCPCS: Performed by: SURGERY

## 2024-11-05 PROCEDURE — 3700000002 HC GENERAL ANESTHESIA TIME - EACH INCREMENTAL 1 MINUTE: Performed by: SURGERY

## 2024-11-05 PROCEDURE — 7100000010 HC PHASE TWO TIME - EACH INCREMENTAL 1 MINUTE: Performed by: SURGERY

## 2024-11-05 PROCEDURE — 7100000001 HC RECOVERY ROOM TIME - INITIAL BASE CHARGE: Performed by: SURGERY

## 2024-11-05 PROCEDURE — 7100000002 HC RECOVERY ROOM TIME - EACH INCREMENTAL 1 MINUTE: Performed by: SURGERY

## 2024-11-05 PROCEDURE — 2720000007 HC OR 272 NO HCPCS: Performed by: SURGERY

## 2024-11-05 PROCEDURE — 3600000003 HC OR TIME - INITIAL BASE CHARGE - PROCEDURE LEVEL THREE: Performed by: SURGERY

## 2024-11-05 PROCEDURE — A49593 PR RPR AA HERNIA 1ST 3-10 CM REDUCIBLE: Performed by: ANESTHESIOLOGY

## 2024-11-05 PROCEDURE — C1781 MESH (IMPLANTABLE): HCPCS | Performed by: SURGERY

## 2024-11-05 PROCEDURE — 2500000004 HC RX 250 GENERAL PHARMACY W/ HCPCS (ALT 636 FOR OP/ED): Performed by: ANESTHESIOLOGIST ASSISTANT

## 2024-11-05 PROCEDURE — 7100000009 HC PHASE TWO TIME - INITIAL BASE CHARGE: Performed by: SURGERY

## 2024-11-05 PROCEDURE — 2500000004 HC RX 250 GENERAL PHARMACY W/ HCPCS (ALT 636 FOR OP/ED): Performed by: SURGERY

## 2024-11-05 PROCEDURE — 2500000001 HC RX 250 WO HCPCS SELF ADMINISTERED DRUGS (ALT 637 FOR MEDICARE OP): Performed by: ANESTHESIOLOGY

## 2024-11-05 PROCEDURE — 2500000004 HC RX 250 GENERAL PHARMACY W/ HCPCS (ALT 636 FOR OP/ED): Performed by: ANESTHESIOLOGY

## 2024-11-05 DEVICE — IMPLANTABLE DEVICE: Type: IMPLANTABLE DEVICE | Site: UMBILICAL | Status: FUNCTIONAL

## 2024-11-05 RX ORDER — SODIUM CHLORIDE, SODIUM LACTATE, POTASSIUM CHLORIDE, CALCIUM CHLORIDE 600; 310; 30; 20 MG/100ML; MG/100ML; MG/100ML; MG/100ML
100 INJECTION, SOLUTION INTRAVENOUS CONTINUOUS
Status: DISCONTINUED | OUTPATIENT
Start: 2024-11-05 | End: 2024-11-05 | Stop reason: CLARIF

## 2024-11-05 RX ORDER — LIDOCAINE HYDROCHLORIDE 10 MG/ML
0.1 INJECTION, SOLUTION EPIDURAL; INFILTRATION; INTRACAUDAL; PERINEURAL ONCE
Status: DISCONTINUED | OUTPATIENT
Start: 2024-11-05 | End: 2024-11-05 | Stop reason: HOSPADM

## 2024-11-05 RX ORDER — PROPOFOL 10 MG/ML
INJECTION, EMULSION INTRAVENOUS AS NEEDED
Status: DISCONTINUED | OUTPATIENT
Start: 2024-11-05 | End: 2024-11-05

## 2024-11-05 RX ORDER — ACETAMINOPHEN 325 MG/1
650 TABLET ORAL EVERY 4 HOURS PRN
Status: DISCONTINUED | OUTPATIENT
Start: 2024-11-05 | End: 2024-11-05 | Stop reason: HOSPADM

## 2024-11-05 RX ORDER — FENTANYL CITRATE 50 UG/ML
INJECTION, SOLUTION INTRAMUSCULAR; INTRAVENOUS AS NEEDED
Status: DISCONTINUED | OUTPATIENT
Start: 2024-11-05 | End: 2024-11-05

## 2024-11-05 RX ORDER — KETOROLAC TROMETHAMINE 30 MG/ML
INJECTION, SOLUTION INTRAMUSCULAR; INTRAVENOUS AS NEEDED
Status: DISCONTINUED | OUTPATIENT
Start: 2024-11-05 | End: 2024-11-05

## 2024-11-05 RX ORDER — HYDROMORPHONE HYDROCHLORIDE 1 MG/ML
INJECTION, SOLUTION INTRAMUSCULAR; INTRAVENOUS; SUBCUTANEOUS AS NEEDED
Status: DISCONTINUED | OUTPATIENT
Start: 2024-11-05 | End: 2024-11-05

## 2024-11-05 RX ORDER — ONDANSETRON HYDROCHLORIDE 2 MG/ML
INJECTION, SOLUTION INTRAVENOUS AS NEEDED
Status: DISCONTINUED | OUTPATIENT
Start: 2024-11-05 | End: 2024-11-05

## 2024-11-05 RX ORDER — MEPERIDINE HYDROCHLORIDE 25 MG/ML
12.5 INJECTION INTRAMUSCULAR; INTRAVENOUS; SUBCUTANEOUS EVERY 10 MIN PRN
Status: DISCONTINUED | OUTPATIENT
Start: 2024-11-05 | End: 2024-11-05 | Stop reason: HOSPADM

## 2024-11-05 RX ORDER — SODIUM CHLORIDE, SODIUM LACTATE, POTASSIUM CHLORIDE, CALCIUM CHLORIDE 600; 310; 30; 20 MG/100ML; MG/100ML; MG/100ML; MG/100ML
100 INJECTION, SOLUTION INTRAVENOUS CONTINUOUS
Status: DISCONTINUED | OUTPATIENT
Start: 2024-11-05 | End: 2024-11-05 | Stop reason: HOSPADM

## 2024-11-05 RX ORDER — MIDAZOLAM HYDROCHLORIDE 1 MG/ML
INJECTION INTRAMUSCULAR; INTRAVENOUS AS NEEDED
Status: DISCONTINUED | OUTPATIENT
Start: 2024-11-05 | End: 2024-11-05

## 2024-11-05 RX ORDER — ROCURONIUM BROMIDE 10 MG/ML
INJECTION, SOLUTION INTRAVENOUS AS NEEDED
Status: DISCONTINUED | OUTPATIENT
Start: 2024-11-05 | End: 2024-11-05

## 2024-11-05 RX ORDER — LIDOCAINE HYDROCHLORIDE 20 MG/ML
INJECTION, SOLUTION EPIDURAL; INFILTRATION; INTRACAUDAL; PERINEURAL AS NEEDED
Status: DISCONTINUED | OUTPATIENT
Start: 2024-11-05 | End: 2024-11-05

## 2024-11-05 RX ORDER — CEFAZOLIN 1 G/1
INJECTION, POWDER, FOR SOLUTION INTRAVENOUS AS NEEDED
Status: DISCONTINUED | OUTPATIENT
Start: 2024-11-05 | End: 2024-11-05

## 2024-11-05 RX ORDER — LABETALOL HYDROCHLORIDE 5 MG/ML
INJECTION, SOLUTION INTRAVENOUS AS NEEDED
Status: DISCONTINUED | OUTPATIENT
Start: 2024-11-05 | End: 2024-11-05

## 2024-11-05 RX ORDER — SODIUM CHLORIDE 0.9 G/100ML
IRRIGANT IRRIGATION AS NEEDED
Status: DISCONTINUED | OUTPATIENT
Start: 2024-11-05 | End: 2024-11-05 | Stop reason: HOSPADM

## 2024-11-05 RX ORDER — ONDANSETRON HYDROCHLORIDE 2 MG/ML
4 INJECTION, SOLUTION INTRAVENOUS ONCE AS NEEDED
Status: DISCONTINUED | OUTPATIENT
Start: 2024-11-05 | End: 2024-11-05 | Stop reason: CLARIF

## 2024-11-05 RX ORDER — LIDOCAINE HYDROCHLORIDE 10 MG/ML
0.1 INJECTION, SOLUTION EPIDURAL; INFILTRATION; INTRACAUDAL; PERINEURAL ONCE
Status: DISCONTINUED | OUTPATIENT
Start: 2024-11-05 | End: 2024-11-05 | Stop reason: SDUPTHER

## 2024-11-05 RX ORDER — PROMETHAZINE HYDROCHLORIDE 25 MG/ML
6.25 INJECTION, SOLUTION INTRAMUSCULAR; INTRAVENOUS ONCE AS NEEDED
Status: DISCONTINUED | OUTPATIENT
Start: 2024-11-05 | End: 2024-11-05 | Stop reason: HOSPADM

## 2024-11-05 RX ORDER — ONDANSETRON HYDROCHLORIDE 2 MG/ML
4 INJECTION, SOLUTION INTRAVENOUS ONCE AS NEEDED
Status: DISCONTINUED | OUTPATIENT
Start: 2024-11-05 | End: 2024-11-05 | Stop reason: HOSPADM

## 2024-11-05 RX ORDER — OXYCODONE HYDROCHLORIDE 5 MG/1
5 TABLET ORAL EVERY 4 HOURS PRN
Status: DISCONTINUED | OUTPATIENT
Start: 2024-11-05 | End: 2024-11-05 | Stop reason: HOSPADM

## 2024-11-05 RX ORDER — OXYCODONE HYDROCHLORIDE 5 MG/1
5 TABLET ORAL EVERY 6 HOURS PRN
Qty: 12 TABLET | Refills: 0 | Status: SHIPPED | OUTPATIENT
Start: 2024-11-05

## 2024-11-05 ASSESSMENT — COLUMBIA-SUICIDE SEVERITY RATING SCALE - C-SSRS
6. HAVE YOU EVER DONE ANYTHING, STARTED TO DO ANYTHING, OR PREPARED TO DO ANYTHING TO END YOUR LIFE?: NO
2. HAVE YOU ACTUALLY HAD ANY THOUGHTS OF KILLING YOURSELF?: NO
1. IN THE PAST MONTH, HAVE YOU WISHED YOU WERE DEAD OR WISHED YOU COULD GO TO SLEEP AND NOT WAKE UP?: NO

## 2024-11-05 ASSESSMENT — PAIN SCALES - GENERAL
PAINLEVEL_OUTOF10: 3
PAINLEVEL_OUTOF10: 2
PAIN_LEVEL: 0
PAINLEVEL_OUTOF10: 3
PAINLEVEL_OUTOF10: 3
PAINLEVEL_OUTOF10: 0 - NO PAIN

## 2024-11-05 ASSESSMENT — PAIN - FUNCTIONAL ASSESSMENT
PAIN_FUNCTIONAL_ASSESSMENT: 0-10

## 2024-11-05 ASSESSMENT — PAIN DESCRIPTION - LOCATION: LOCATION: ABDOMEN

## 2024-11-05 ASSESSMENT — PAIN DESCRIPTION - ORIENTATION: ORIENTATION: MID

## 2024-11-05 NOTE — OP NOTE
Open Umbilical Hernia Repair; Mesh Placement Operative Note     Date: 2024  OR Location: [facility] A OR    Name: Christopher Lam, : 1980, Age: 43 y.o., MRN: [id number], Sex: male    Diagnosis  Pre-op Diagnosis      * Umbilical hernia without obstruction and without gangrene [K42.9] Post-op Diagnosis     * Umbilical hernia without obstruction and without gangrene [K42.9]     Procedures  Open Umbilical Hernia Repair; Mesh Placement  42192 - [place] RPR AA HERNIA 1ST < 3 CM REDUCIBLE      Surgeons      * Tj Hernandez - Primary    Resident/Fellow/Other Assistant:  Surgeons and Role:  * No surgeons found with a matching role *    Staff:   Surgical Assistant:   Circulator: Rashida Magana Person: Tammy  Circulator: Ysabel    Anesthesia Staff: Anesthesiologist: Eddi Duval MD  C-AA: EVAN Gonzalez    Procedure Summary  Anesthesia: General  ASA: II  Estimated Blood Loss: 3 mL  Intra-op Medications:   Administrations occurring from 1440 to 1610 on 24:   Medication Name Total Dose   BUPivacaine HCl (Marcaine) 0.5 % (5 mg/mL) 20 mL, lidocaine (Xylocaine) 20 mL syringe 30 mL   ceFAZolin (Ancef) vial 1 g 2 g   dexAMETHasone (Decadron) injection 4 mg/mL 4 mg   fentaNYL (Sublimaze) injection 50 mcg/mL 100 mcg   labetalol (Normodyne,Trandate) injection 15 mg   lidocaine PF (Xylocaine-MPF) local injection 2 % 100 mg   lubricating eye drops ophthalmic solution 2 drop   midazolam PF (Versed) injection 1 mg/mL 2 mg   propofol (Diprivan) injection 10 mg/mL 200 mg   rocuronium (ZeMuron) 50 mg/5 mL injection 100 mg              Anesthesia Record               Intraprocedure I/O Totals       None           Specimen:   ID Type Source Tests Collected by Time   1 : HERNIA SAC Tissue HERNIA SAC SURGICAL PATHOLOGY EXAM Tj Hernandez MD 2024 1601                      Findings: The facial defect measured 3.2 cm    Indications: Christopher Lam is an 43 y.o. male who is having surgery for Umbilical hernia without obstruction and  without gangrene [K42.9].     The patient was seen in the preoperative area. The risks, benefits, complications, treatment options, non-operative alternatives, expected recovery and outcomes were discussed with the patient. The possibilities of reaction to medication, pulmonary aspiration, injury to surrounding structures, bleeding, recurrent infection, the need for additional procedures, failure to diagnose a condition, and creating a complication requiring transfusion or operation were discussed with the patient. The patient concurred with the proposed plan, giving informed consent.  The site of surgery was properly noted/marked if necessary per policy. The patient has been actively warmed in preoperative area. Preoperative antibiotics have been ordered and given within 1 hours of incision. Venous thrombosis prophylaxis have been ordered including bilateral sequential compression devices     Procedure Details:    The patient is a 43 y.o. year-old  male comes in for elective repair of a moderate-sized umbilical  hernia.  The risks, benefits, and alternatives were discussed  preoperative and he agreed to the operation.     DESCRIPTION OF PROCEDURE:    The patient was taken to the operating room and placed supine on the  operating table.  Time-out was established.  General anesthesia was  induced.  he received antibiotics.  We made an infraumbilical  midline incision, carried our dissection down to the abdominal wall  fascia.  We encircled the hernia defect and  the hernia sac  from the skin of the umbilicus.  We excised the sac and handed off  the field as a specimen.  A portion of the preperitoneal fat was  included in this.  The defect measured 3.2 cm, and we elected to repair  this using a 4.3 cm round Ventralex mesh.  This was placed as an  underlay and secured on 4 quadrants using 0-Prolene suture placed  in an interrupted horizontal mattress fashion.  The knots were tied  anterior to the abdominal  wall fascia.  This brought the mesh to lie  nicely as an underlay.  We then reapproximated the native fascia over  repair using 0-Yicryl suture.  We tacked the skin of the umbilicus down to the repair.  Skin was then closed using 3-0 and 4-0  subcuticular Vicryl stitches followed by Dermabond glue.  Patient tolerated the procedure without difficulty and was returned to the recovery room in stable conditions.       Complications:  None; patient tolerated the procedure well.    Disposition: PACU - hemodynamically stable.  Condition: stable           Attending Attestation: I was present and scrubbed for the entire procedure.    Tj Hernandez  Phone Number: [phone]

## 2024-11-05 NOTE — BRIEF OP NOTE
Date: 2024  OR Location: [facility] OR    Name: Christopher Lam, : 1980, Age: 43 y.o., MRN: [id number], Sex: male    Diagnosis  Pre-op Diagnosis      * Umbilical hernia without obstruction and without gangrene [K42.9] Post-op Diagnosis     * Umbilical hernia without obstruction and without gangrene [K42.9]     Procedures  Open Umbilical Hernia Repair; Mesh Placement  24681 - [place] RPR AA HERNIA 1ST < 3 CM REDUCIBLE      Surgeons      * Tj Hernandez - Primary    Resident/Fellow/Other Assistant:  Surgeons and Role:  * No surgeons found with a matching role *    Staff:   Surgical Assistant:   Circulator: Rashida Magana Person: Tammy  Circulator: Ysabel    Anesthesia Staff: Anesthesiologist: Eddi Duval MD  C-AA: EVAN Gonzalez    Procedure Summary  Anesthesia: General  ASA: II  Estimated Blood Loss: 3mL  Intra-op Medications:   Administrations occurring from 1440 to 1610 on 24:   Medication Name Total Dose   BUPivacaine HCl (Marcaine) 0.5 % (5 mg/mL) 20 mL, lidocaine (Xylocaine) 20 mL syringe 30 mL   ceFAZolin (Ancef) vial 1 g 2 g   dexAMETHasone (Decadron) injection 4 mg/mL 4 mg   fentaNYL (Sublimaze) injection 50 mcg/mL 100 mcg   labetalol (Normodyne,Trandate) injection 15 mg   lidocaine PF (Xylocaine-MPF) local injection 2 % 100 mg   lubricating eye drops ophthalmic solution 2 drop   midazolam PF (Versed) injection 1 mg/mL 2 mg   propofol (Diprivan) injection 10 mg/mL 200 mg   rocuronium (ZeMuron) 50 mg/5 mL injection 100 mg              Anesthesia Record               Intraprocedure I/O Totals       None           Specimen:   ID Type Source Tests Collected by Time   1 : HERNIA SAC Tissue HERNIA SAC SURGICAL PATHOLOGY EXAM Tj Hernandez MD 2024 1601                  Findings: approximally 3cm fascial defect noted and repaired with mesh    Complications:  None; patient tolerated the procedure well.     Disposition: PACU - hemodynamically stable.  Condition: stable  Specimens Collected:   ID  Type Source Tests Collected by Time   1 : HERNIA SAC Tissue HERNIA SAC SURGICAL PATHOLOGY EXAM Tj Hernandez MD 11/5/2024 1601     Attending Attestation: I was present and scrubbed for the entire procedure.    Tj Hernandez  Phone Number: [phone]

## 2024-11-05 NOTE — ANESTHESIA PREPROCEDURE EVALUATION
Patient: Christopher Lam    Procedure Information       Date/Time: 11/05/24 1440    Procedure: Open Umbilical Hernia Repair; Mesh Placement    Location: [facility] A OR 04 / Virtual [facility] A OR    Surgeons: Tj Hernandez MD            Relevant Problems   Cardiac   (+) Benign essential hypertension      Pulmonary   (+) FLORINA (obstructive sleep apnea)      GI   (+) Chronic diarrhea   (+) Esophageal reflux   (+) Gastrointestinal hemorrhage with melena      Musculoskeletal   (+) Lumbar stenosis       Clinical information reviewed:   Tobacco  Allergies  Meds   Med Hx  Surg Hx   Fam Hx  Soc Hx        NPO Detail:  NPO/Void Status  Carbohydrate Drink Given Prior to Surgery? : N  Date of Last Liquid: 11/05/24  Time of Last Liquid: 1156  Date of Last Solid: 11/04/24  Time of Last Solid: 2330  Last Intake Type: Clear fluids; Light meal  Time of Last Void: 1100         Physical Exam    Airway  Mallampati: II     Cardiovascular   Rhythm: regular  Rate: normal     Dental    Pulmonary   Breath sounds clear to auscultation     Abdominal          Anesthesia Plan    History of general anesthesia?: yes  History of complications of general anesthesia?: no    ASA 2     general     intravenous induction   Anesthetic plan and risks discussed with patient.      IV induction  ETT  GA

## 2024-11-05 NOTE — ANESTHESIA PROCEDURE NOTES
Airway  Date/Time: 11/5/2024 3:45 PM  Urgency: elective    Airway not difficult    Staffing  Performed: EVAN   Authorized by: Eddi Duval MD    Performed by: EVAN Gonzalez  Patient location during procedure: OR    Indications and Patient Condition  Indications for airway management: anesthesia  Spontaneous Ventilation: absent  Sedation level: deep  Preoxygenated: yes  Patient position: sniffing  MILS maintained throughout  Mask difficulty assessment: 1 - vent by mask    Final Airway Details  Final airway type: endotracheal airway      Successful airway: ETT  Cuffed: yes   Successful intubation technique: direct laryngoscopy  Blade: Arjun  Blade size: #4  ETT size (mm): 7.5  Cormack-Lehane Classification: grade IIa - partial view of glottis  Placement verified by: chest auscultation and capnometry   Measured from: lips  ETT to lips (cm): 23  Number of attempts at approach: 1

## 2024-11-05 NOTE — ANESTHESIA POSTPROCEDURE EVALUATION
Patient: Christopher Lam    Procedure Summary       Date: 11/05/24 Room / Location: [facility] OR 04 / [facility] OR    Anesthesia Start: 1538 Anesthesia Stop:     Procedure: Open Umbilical Hernia Repair; Mesh Placement (Abdomen) Diagnosis:       Umbilical hernia without obstruction and without gangrene      (Umbilical hernia without obstruction and without gangrene [K42.9])    Surgeons: Tj Hernandez MD Responsible Provider: Eddi Duval MD    Anesthesia Type: general ASA Status: 2            Anesthesia Type: general    Vitals Value Taken Time   /92 11/05/24 1648   Temp 36.4 11/05/24 1649   Pulse 85 11/05/24 1648   Resp 16 11/05/24 1649   SpO2 99 % 11/05/24 1648   Vitals shown include unfiled device data.    Anesthesia Post Evaluation    Patient location during evaluation: bedside  Patient participation: complete - patient cannot participate  Level of consciousness: awake  Pain score: 0  Pain management: adequate  Airway patency: patent  Cardiovascular status: acceptable and hemodynamically stable  Respiratory status: acceptable and nonlabored ventilation  Hydration status: acceptable  Postoperative Nausea and Vomiting: none        No notable events documented.  Pt stable in PACU.

## 2024-11-11 LAB
LABORATORY COMMENT REPORT: NORMAL
PATH REPORT.FINAL DX SPEC: NORMAL
PATH REPORT.GROSS SPEC: NORMAL
PATH REPORT.RELEVANT HX SPEC: NORMAL
PATH REPORT.TOTAL CANCER: NORMAL

## 2024-11-12 DIAGNOSIS — E66.812 CLASS 2 OBESITY WITH BODY MASS INDEX (BMI) OF 35 TO 39.9 WITHOUT COMORBIDITY: ICD-10-CM

## 2024-11-12 RX ORDER — SEMAGLUTIDE 1.34 MG/ML
1 INJECTION, SOLUTION SUBCUTANEOUS
Qty: 9 ML | Refills: 3 | Status: SHIPPED | OUTPATIENT
Start: 2024-11-12

## 2024-11-16 ENCOUNTER — HOSPITAL ENCOUNTER (OUTPATIENT)
Dept: RADIOLOGY | Facility: HOSPITAL | Age: 44
Discharge: HOME | End: 2024-11-16
Payer: COMMERCIAL

## 2024-11-16 DIAGNOSIS — I10 BENIGN ESSENTIAL HYPERTENSION: ICD-10-CM

## 2024-11-16 DIAGNOSIS — E66.812 CLASS 2 OBESITY WITH BODY MASS INDEX (BMI) OF 35 TO 39.9 WITHOUT COMORBIDITY: ICD-10-CM

## 2024-11-16 PROCEDURE — 75571 CT HRT W/O DYE W/CA TEST: CPT

## 2024-11-18 ENCOUNTER — OFFICE VISIT (OUTPATIENT)
Dept: SURGERY | Facility: CLINIC | Age: 44
End: 2024-11-18
Payer: COMMERCIAL

## 2024-11-18 VITALS
TEMPERATURE: 98.6 F | DIASTOLIC BLOOD PRESSURE: 87 MMHG | SYSTOLIC BLOOD PRESSURE: 138 MMHG | HEART RATE: 89 BPM | WEIGHT: 236 LBS | BODY MASS INDEX: 32.92 KG/M2

## 2024-11-18 DIAGNOSIS — Z09 POSTOPERATIVE EXAMINATION: Primary | ICD-10-CM

## 2024-11-18 DIAGNOSIS — K42.9 UMBILICAL HERNIA WITHOUT OBSTRUCTION AND WITHOUT GANGRENE: ICD-10-CM

## 2024-11-18 PROCEDURE — 99212 OFFICE O/P EST SF 10 MIN: CPT | Performed by: SURGERY

## 2024-11-18 PROCEDURE — 3079F DIAST BP 80-89 MM HG: CPT | Performed by: SURGERY

## 2024-11-18 PROCEDURE — 3075F SYST BP GE 130 - 139MM HG: CPT | Performed by: SURGERY

## 2024-11-18 ASSESSMENT — ENCOUNTER SYMPTOMS: DEPRESSION: 0

## 2024-11-18 ASSESSMENT — PAIN SCALES - GENERAL: PAINLEVEL_OUTOF10: 2

## 2024-11-18 NOTE — PROGRESS NOTES
Subjective   Patient ID: Christopher Lam is a 43 y.o. male who presents for Post-op.           Mr. Christopher Lam is a 43 y.o. year old male who comes in today for follow-up after undergoing Umbilical hernia repair with mesh.   This procedure was performed on  11/5/24    Patient is doing very well and is pleased with the outcome of the surgery.    Tolerating a regular diet, bowel movements are normal    On exam patient looks well    Incisions are healing very nicely    Surgical Pathology Exam: [id number]  Order: [id number]   Collected 11/5/2024 16:01       Status: Final result       Visible to patient: Yes (seen)       Dx: Umbilical hernia without obstruction ...    0 Result Notes       Component  Resulting Agency   FINAL DIAGNOSIS   A. UMBILICAL HERNIA REPAIR:  -- HERNIA SAC.                Impression: Doing well following  Umbilical hernia repair    Discussed increasing activity level    Follow-up with me as needed     Tj Hernandez MD 11/18/24 10:06 AM

## 2024-11-18 NOTE — LETTER
November 18, 2024     John Blair MD  [address] [facility] [address]    Patient: Christopher Lam   YOB: 1980   Date of Visit: 11/18/2024       Dear Dr. John Blair MD:    Thank you for referring Christopher Lam to me for evaluation. Below are my notes for this consultation.  If you have questions, please do not hesitate to call me. I look forward to following your patient along with you.       Sincerely,     Tj Hernandez MD      CC: No Recipients  ______________________________________________________________________________________    Subjective  Patient ID: Christopher Lam is a 43 y.o. male who presents for Post-op.           Mr. Christopher Lam is a 43 y.o. year old male who comes in today for follow-up after undergoing Umbilical hernia repair with mesh.   This procedure was performed on  11/5/24    Patient is doing very well and is pleased with the outcome of the surgery.    Tolerating a regular diet, bowel movements are normal    On exam patient looks well    Incisions are healing very nicely    Surgical Pathology Exam: [id number]  Order: [id number]   Collected 11/5/2024 16:01       Status: Final result       Visible to patient: Yes (seen)       Dx: Umbilical hernia without obstruction ...    0 Result Notes       Component  Resulting Agency   FINAL DIAGNOSIS   A. UMBILICAL HERNIA REPAIR:  -- HERNIA SAC.                Impression: Doing well following  Umbilical hernia repair    Discussed increasing activity level    Follow-up with me as needed     Tj Hernandez MD 11/18/24 10:06 AM

## 2024-11-22 DIAGNOSIS — E66.812 CLASS 2 OBESITY WITH BODY MASS INDEX (BMI) OF 35 TO 39.9 WITHOUT COMORBIDITY: ICD-10-CM

## 2024-11-22 DIAGNOSIS — E66.812 CLASS 2 OBESITY WITH BODY MASS INDEX (BMI) OF 35 TO 39.9 WITHOUT COMORBIDITY: Primary | ICD-10-CM

## 2024-11-22 RX ORDER — SEMAGLUTIDE 1.34 MG/ML
INJECTION, SOLUTION SUBCUTANEOUS
Qty: 3 ML | Refills: 3 | Status: SHIPPED | OUTPATIENT
Start: 2024-11-22 | End: 2024-11-22

## 2024-11-22 RX ORDER — SEMAGLUTIDE 1.34 MG/ML
INJECTION, SOLUTION SUBCUTANEOUS
Qty: 3 ML | Refills: 3 | Status: SHIPPED | OUTPATIENT
Start: 2024-11-22

## 2024-11-23 ENCOUNTER — LAB (OUTPATIENT)
Dept: LAB | Facility: LAB | Age: 44
End: 2024-11-23
Payer: COMMERCIAL

## 2024-11-23 DIAGNOSIS — E66.812 CLASS 2 OBESITY WITH BODY MASS INDEX (BMI) OF 35 TO 39.9 WITHOUT COMORBIDITY: ICD-10-CM

## 2024-11-23 LAB
ALBUMIN SERPL BCP-MCNC: 4 G/DL (ref 3.4–5)
ALP SERPL-CCNC: 70 U/L (ref 33–120)
ALT SERPL W P-5'-P-CCNC: 21 U/L (ref 10–52)
ANION GAP SERPL CALC-SCNC: 8 MMOL/L (ref 10–20)
AST SERPL W P-5'-P-CCNC: 18 U/L (ref 9–39)
BILIRUB SERPL-MCNC: 0.4 MG/DL (ref 0–1.2)
BUN SERPL-MCNC: 14 MG/DL (ref 6–23)
CALCIUM SERPL-MCNC: 9.7 MG/DL (ref 8.6–10.6)
CHLORIDE SERPL-SCNC: 103 MMOL/L (ref 98–107)
CO2 SERPL-SCNC: 34 MMOL/L (ref 21–32)
CREAT SERPL-MCNC: 1.16 MG/DL (ref 0.5–1.3)
EGFRCR SERPLBLD CKD-EPI 2021: 79 ML/MIN/1.73M*2
EST. AVERAGE GLUCOSE BLD GHB EST-MCNC: 126 MG/DL
GLUCOSE SERPL-MCNC: 110 MG/DL (ref 74–99)
HBA1C MFR BLD: 6 %
POTASSIUM SERPL-SCNC: 4.2 MMOL/L (ref 3.5–5.3)
PROT SERPL-MCNC: 6.1 G/DL (ref 6.4–8.2)
SODIUM SERPL-SCNC: 141 MMOL/L (ref 136–145)

## 2024-11-23 PROCEDURE — 80053 COMPREHEN METABOLIC PANEL: CPT

## 2024-11-23 PROCEDURE — 83036 HEMOGLOBIN GLYCOSYLATED A1C: CPT

## 2024-11-23 PROCEDURE — 36415 COLL VENOUS BLD VENIPUNCTURE: CPT

## 2024-12-08 DIAGNOSIS — I10 ESSENTIAL (PRIMARY) HYPERTENSION: ICD-10-CM

## 2024-12-09 RX ORDER — CHLORTHALIDONE 25 MG/1
12.5 TABLET ORAL DAILY
Qty: 45 TABLET | Refills: 2 | Status: SHIPPED | OUTPATIENT
Start: 2024-12-09

## 2025-08-03 DIAGNOSIS — K21.9 GASTROESOPHAGEAL REFLUX DISEASE, UNSPECIFIED WHETHER ESOPHAGITIS PRESENT: ICD-10-CM

## 2025-08-03 DIAGNOSIS — I10 BENIGN ESSENTIAL HYPERTENSION: ICD-10-CM

## 2025-08-04 RX ORDER — AMLODIPINE AND BENAZEPRIL HYDROCHLORIDE 10; 40 MG/1; MG/1
1 CAPSULE ORAL DAILY
Qty: 90 CAPSULE | Refills: 3 | Status: SHIPPED | OUTPATIENT
Start: 2025-08-04

## 2025-08-04 RX ORDER — OMEPRAZOLE 20 MG/1
20 CAPSULE, DELAYED RELEASE ORAL
Qty: 90 CAPSULE | Refills: 3 | Status: SHIPPED | OUTPATIENT
Start: 2025-08-04

## (undated) DEVICE — SUTURE, ETHIBOND, XTRA, 30 IN, 0, CT-2, GREEN

## (undated) DEVICE — GLOVE, SURGICAL, PROTEXIS PI BLUE W/NEUTHERA, 8.0, PF, LF

## (undated) DEVICE — SUTURE, PDS II, 1, 36 IN, CT-1, VIOLET

## (undated) DEVICE — DRAPE, SHEET, ENDOSCOPY, GENERAL, FENESTRATED, ARMBOARD COVER, 98 X 123.5 IN, DISPOSABLE, LF, STERILE

## (undated) DEVICE — SUTURE, VICRYL, 4-0, 27IN, RB-1

## (undated) DEVICE — SUTURE, MONOCRYL, 4-0, 27 IN, PS-2, UNDYED

## (undated) DEVICE — SUTURE, PROLENE, 0, 30 IN, CT-2, BLUE

## (undated) DEVICE — ADHESIVE, SKIN, DERMABOND ADVANCED, 15CM, PEN-STYLE

## (undated) DEVICE — Device

## (undated) DEVICE — GOWN, SURGICAL, SMARTGOWN, XLARGE, STERILE

## (undated) DEVICE — SUTURE, VICRYL PLUS 3-0, SH, 27IN

## (undated) DEVICE — SOLUTION, IRRIGATION, SODIUM CHLORIDE 0.9%, 1000 ML, POUR BOTTLE

## (undated) DEVICE — PAD, GROUNDING, ELECTROSURGICAL, W/15 FT CABLE, POLYHESIVE II, ADULT, LF

## (undated) DEVICE — SUTURE, VICRYL, 3-0, 27 IN, SH